# Patient Record
Sex: MALE | Race: WHITE | Employment: FULL TIME | RURAL
[De-identification: names, ages, dates, MRNs, and addresses within clinical notes are randomized per-mention and may not be internally consistent; named-entity substitution may affect disease eponyms.]

---

## 2022-01-24 ENCOUNTER — TELEPHONE (OUTPATIENT)
Dept: FAMILY MEDICINE CLINIC | Age: 52
End: 2022-01-24

## 2022-01-24 NOTE — TELEPHONE ENCOUNTER
Patient has not yet established care. He needs an appointment [facility] orders.

## 2022-01-24 NOTE — TELEPHONE ENCOUNTER
Emma from [facility] is calling to see if Dr. Francy Vasquez would do a verbal for Pt to have Speech Therapy from having a stroke. Pt had a stroke from [address] [facility]. He was at [facility]. I reminded Home Health that Pt had not been seen yet. She understands.

## 2022-01-25 ENCOUNTER — TELEPHONE (OUTPATIENT)
Dept: FAMILY MEDICINE CLINIC | Age: 52
End: 2022-01-25

## 2022-01-25 NOTE — TELEPHONE ENCOUNTER
Pt's Blood Pressure is 159 over 100. Wife states BP has been high since hospital stay.  Dr Keyla Kingston Pt, Please advise

## 2022-01-25 NOTE — TELEPHONE ENCOUNTER
Attempted to call. No answer. Message left. Please advise that Dr Benita Velazquez has not seen this patient.

## 2022-02-17 ENCOUNTER — TELEPHONE (OUTPATIENT)
Dept: FAMILY MEDICINE CLINIC | Age: 52
End: 2022-02-17

## 2022-02-17 NOTE — TELEPHONE ENCOUNTER
----- Message from [address] sent at 2/16/2022  9:37 AM EST -----  Subject: Message to Provider    QUESTIONS  Information for Provider? Patient has appointment on 2/23 is requesting 4   days worth of Lipitor to get him through until his appointment.   ---------------------------------------------------------------------------  --------------  [address]  What is the best way for the office to contact you? OK to leave message on   voicemail  Preferred Call Back Phone Number? [phone]  ---------------------------------------------------------------------------  --------------  SCRIPT ANSWERS  Relationship to Patient? Third Party  Representative Name?  wife-ana

## 2022-02-18 ENCOUNTER — TELEPHONE (OUTPATIENT)
Dept: FAMILY MEDICINE CLINIC | Age: 52
End: 2022-02-18

## 2022-02-18 NOTE — TELEPHONE ENCOUNTER
Pt is done with home health and is ready for out patient therapy. Pt would like [facility] Referral sent, please advise. NP appt is set for 2-23 with Dr Soha Starr.

## 2022-02-18 NOTE — TELEPHONE ENCOUNTER
Returned call to Kassidy Vinson. She was advised referral will be put in at visit. Verbalized understanding.

## 2022-02-23 ENCOUNTER — TELEPHONE (OUTPATIENT)
Dept: FAMILY MEDICINE CLINIC | Age: 52
End: 2022-02-23

## 2022-02-23 ENCOUNTER — OFFICE VISIT (OUTPATIENT)
Dept: FAMILY MEDICINE CLINIC | Age: 52
End: 2022-02-23
Payer: COMMERCIAL

## 2022-02-23 VITALS
BODY MASS INDEX: 34.96 KG/M2 | HEIGHT: 70 IN | RESPIRATION RATE: 18 BRPM | TEMPERATURE: 98.3 F | OXYGEN SATURATION: 96 % | WEIGHT: 244.2 LBS | HEART RATE: 90 BPM | DIASTOLIC BLOOD PRESSURE: 91 MMHG | SYSTOLIC BLOOD PRESSURE: 140 MMHG

## 2022-02-23 DIAGNOSIS — Z86.73 HISTORY OF CVA (CEREBROVASCULAR ACCIDENT): ICD-10-CM

## 2022-02-23 DIAGNOSIS — E66.09 CLASS 2 OBESITY DUE TO EXCESS CALORIES WITHOUT SERIOUS COMORBIDITY WITH BODY MASS INDEX (BMI) OF 35.0 TO 35.9 IN ADULT: ICD-10-CM

## 2022-02-23 DIAGNOSIS — E78.00 PURE HYPERCHOLESTEROLEMIA: ICD-10-CM

## 2022-02-23 DIAGNOSIS — I63.81 LACUNAR INFARCTION (HCC): Primary | ICD-10-CM

## 2022-02-23 DIAGNOSIS — R53.1 RIGHT SIDED WEAKNESS: ICD-10-CM

## 2022-02-23 DIAGNOSIS — I10 PRIMARY HYPERTENSION: ICD-10-CM

## 2022-02-23 PROCEDURE — 99205 OFFICE O/P NEW HI 60 MIN: CPT | Performed by: FAMILY MEDICINE

## 2022-02-23 RX ORDER — METOPROLOL TARTRATE 25 MG/1
12.5 TABLET, FILM COATED ORAL 2 TIMES DAILY
Qty: 30 TABLET | Refills: 1 | Status: SHIPPED | OUTPATIENT
Start: 2022-02-23 | End: 2022-04-18 | Stop reason: SDUPTHER

## 2022-02-23 RX ORDER — ATORVASTATIN CALCIUM 40 MG/1
40 TABLET, FILM COATED ORAL DAILY
COMMUNITY
End: 2022-02-23 | Stop reason: SDUPTHER

## 2022-02-23 RX ORDER — CLOPIDOGREL BISULFATE 75 MG/1
75 TABLET ORAL DAILY
Qty: 30 TABLET | Refills: 1 | Status: SHIPPED | OUTPATIENT
Start: 2022-02-23 | End: 2022-04-18 | Stop reason: SDUPTHER

## 2022-02-23 RX ORDER — CLOPIDOGREL BISULFATE 75 MG/1
75 TABLET ORAL DAILY
COMMUNITY
Start: 2022-02-18 | End: 2022-02-23 | Stop reason: SDUPTHER

## 2022-02-23 RX ORDER — ASPIRIN 81 MG/1
81 TABLET ORAL DAILY
COMMUNITY
End: 2022-02-23

## 2022-02-23 RX ORDER — LOSARTAN POTASSIUM 25 MG/1
25 TABLET ORAL DAILY
Qty: 30 TABLET | Refills: 1 | Status: SHIPPED | OUTPATIENT
Start: 2022-02-23 | End: 2022-04-18 | Stop reason: SDUPTHER

## 2022-02-23 RX ORDER — ATORVASTATIN CALCIUM 40 MG/1
40 TABLET, FILM COATED ORAL DAILY
Qty: 30 TABLET | Refills: 1 | Status: SHIPPED | OUTPATIENT
Start: 2022-02-23 | End: 2022-04-18 | Stop reason: SDUPTHER

## 2022-02-23 NOTE — LETTER
2/23/2022 12:18 PM    Mr. Sonya Martines  [address] [address]      To Whom It May Concern:    Sonya Martines is currently under the care of Na Bird. He has established care in this office and we are working on a return to work plan. If there are questions or concerns please have the patient contact our office.         Sincerely,      Allegra Vyas MD

## 2022-02-23 NOTE — PROGRESS NOTES
1. Have you been to the ER, urgent care clinic since your last visit? Hospitalized since your last visit? Yes When: [facility] 1/17/-1/21/22 stroke, covid     2. Have you seen or consulted any other health care providers outside of the [address] [place] [address] since your last visit? Include any pap smears or colon screening. Yes When: [facility] dr. edmondson     Reviewed record in preparation for visit and have necessary documentation  Pt did not bring medication to office visit for review  Patient is accompanied by self I have received verbal consent from Jamaal Reyez to discuss any/all medical information while they are present in the room.     Goals that were addressed and/or need to be completed during or after this appointment include     Health Maintenance Due   Topic Date Due    Hepatitis C Screening  Never done    Depression Screen  Never done    COVID-19 Vaccine (1) Never done    DTaP/Tdap/Td series (1 - Tdap) Never done    Lipid Screen  Never done    Colorectal Cancer Screening Combo  Never done    Shingrix Vaccine Age 50> (1 of 2) Never done    Flu Vaccine (1) Never done

## 2022-02-24 NOTE — PROGRESS NOTES
Progress Note    Patient: Courtney aRvi MRN: [id number]  SSN: [id number]    YOB: 1970  Age: 46 y.o. Sex: male        Chief Complaint   Patient presents with   [address]     he is a 46y.o. year old male who presents to [facility]. Patient with recent hospitalization for stroke like symptoms and hypertensive urgency. Admitted to [facility] on 1/17/22 with discharge on 1/21/22. MRI identified acute lacunar infarct with old lacunar infarct identified as well. Patient with residual right sided weakness. He has been getting HH. He would like to start outpatient PT. He has been out of work since hospitalization. Patient discharged on metoprolol, atorvastatin and clopidogrel. He needs medication refills. Patient's wife has BP log showing HTN not well controlled. Encounter Diagnoses   Name Primary?     Lacunar infarction ([facility] Yes    History of CVA (cerebrovascular accident)     Right sided weakness     Primary hypertension     Pure hypercholesterolemia     Class 2 obesity due to excess calories without serious comorbidity with body mass index (BMI) of 35.0 to 35.9 in adult        Patient Active Problem List   Diagnosis Code    Migraine without aura, without mention of intractable migraine without mention of status migrainosus G43.009    Lacunar infarction ([facility] I63.81    Primary hypertension I10    History of CVA (cerebrovascular accident) Z86.73    Right sided weakness R53.1    Pure hypercholesterolemia E78.00    Class 2 obesity due to excess calories without serious comorbidity with body mass index (BMI) of 35.0 to 35.9 in adult E66.09, Z68.35     Past Surgical History:   Procedure Laterality Date    HX CHOLECYSTECTOMY       Social History     Socioeconomic History    Marital status:      Spouse name: Not on file    Number of children: Not on file    Years of education: Not on file    Highest education level: Not on file   Occupational History    Not on file   Tobacco Use  Smoking status: Never Smoker    Smokeless tobacco: Not on file   Substance and Sexual Activity    Alcohol use: No    Drug use: Not on file    Sexual activity: Not on file   Other Topics Concern    Not on file   Social History Narrative    Not on file     Social Determinants of Health     Financial Resource Strain:     Difficulty of Paying Living Expenses: Not on file   Food Insecurity:     Worried About Running Out of Food in the Last Year: Not on file    Eddie of Food in the Last Year: Not on file   Transportation Needs:     Lack of Transportation (Medical): Not on file    Lack of Transportation (Non-Medical): Not on file   Physical Activity:     Days of Exercise per Week: Not on file    Minutes of Exercise per Session: Not on file   Stress:     Feeling of Stress : Not on file   Social Connections:     Frequency of Communication with Friends and Family: Not on file    Frequency of Social Gatherings with Friends and Family: Not on file    Attends Scientology Services: Not on file    Active Member of [address] [place] [address] or Organizations: Not on file    Attends Club or Organization Meetings: Not on file    Marital Status: Not on file   Intimate Partner Violence:     Fear of Current or Ex-Partner: Not on file    Emotionally Abused: Not on file    Physically Abused: Not on file    Sexually Abused: Not on file   Housing Stability:     Unable to Pay for Housing in the Last Year: Not on file    Number of [place] in the Last Year: Not on file    Unstable Housing in the Last Year: Not on file     No family history on file. Current Outpatient Medications   Medication Sig    atorvastatin (LIPITOR) 40 mg tablet Take 1 Tablet by mouth daily.  metoprolol tartrate (LOPRESSOR) 25 mg tablet Take 0.5 Tablets by mouth two (2) times a day.  clopidogreL (PLAVIX) 75 mg tab Take 1 Tablet by mouth daily.  losartan (COZAAR) 25 mg tablet Take 1 Tablet by mouth daily.      No current facility-administered medications for this visit. Allergies   Allergen Reactions    Hydrocodone Other (comments)     headache       Review of Systems:  Constitutional: Negative for fatigue, malaise  Resp: Negative for cough, wheezing or SOB  CV: Negative for chest pain, dizziness or palpitations  GI: Negative for nausea or abdominal pain  MS: see HPI  Neuro: see HPI  Psych: Negative for depression or anxiety     Vitals:    02/23/22 1125 02/23/22 1140   BP: (!) 143/92 (!) 140/91   Pulse: 90    Resp: 18    Temp: 98.3 °F (36.8 °C)    TempSrc: Oral    SpO2: 96%    Weight: 244 lb 3.2 oz (110.8 kg)    Height: 5' 10\" (1.778 m)        Physical Examination:  General: Well developed, well nourished, in no acute distress  Head: Normocephalic, atraumatic  Eyes: Sclera clear, EOMI  Neck: Normal range of motion  Respiratory: Symmetrical, unlabored effort  Cardiovascular: Regular rate and rhythm  Extremities: Full range of motion, normal gait  Neurologic: right sided weakness  Psych: Active, alert and oriented. Affect appropriate       ICD-10-CM ICD-9-CM    1. Lacunar infarction ([facility]  I63.81 434.91    2. History of CVA (cerebrovascular accident)  Z86.73 V12.54 clopidogreL (PLAVIX) 75 mg tab   3. Right sided weakness  R53.1 728.87    4. Primary hypertension  I10 401.9 atorvastatin (LIPITOR) 40 mg tablet      metoprolol tartrate (LOPRESSOR) 25 mg tablet   5. Pure hypercholesterolemia  E78.00 272.0 losartan (COZAAR) 25 mg tablet   6. Class 2 obesity due to excess calories without serious comorbidity with body mass index (BMI) of 35.0 to 35.9 in adult  E66.09 278.00     Z68.35 V85.35        Plan of care:  Diagnoses were discussed in detail with patient. Medication risks/benefits/side effects discussed with patient. Importance of compliance with all prescribed medications discussed. All of the patient's questions were addressed and answered to apparent satisfaction. The patient understands and agrees with our plan of care.   The patient knows to call back if they have questions about the plan of care or if symptoms change. The patient received an After-Visit Summary which contains VS, diagnoses, orders, allergy and medication lists. Future Appointments   Date Time Provider Janice Kessler   3/9/2022  8:40 AM Dedra Andrew MD [facility] BS AMB           Follow-up and Dispositions    · Return in about 2 weeks (around 3/9/2022).

## 2022-03-07 ENCOUNTER — TELEPHONE (OUTPATIENT)
Dept: FAMILY MEDICINE CLINIC | Age: 52
End: 2022-03-07

## 2022-03-07 ENCOUNTER — NURSE TRIAGE (OUTPATIENT)
Dept: OTHER | Facility: CLINIC | Age: 52
End: 2022-03-07

## 2022-03-07 RX ORDER — METHOCARBAMOL 500 MG/1
500 TABLET, FILM COATED ORAL 3 TIMES DAILY
Qty: 20 TABLET | Refills: 0 | Status: SHIPPED | OUTPATIENT
Start: 2022-03-07 | End: 2022-04-11

## 2022-03-07 NOTE — TELEPHONE ENCOUNTER
Received call from Loli Dietrich at [facility] with [employer] Complaint. Subjective: Caller states \"went to physical therapy, pain in left neck radiates to left arm, hurts so bad it is causing nausea and headaches. Stiff yesterday, and feels like a spike in there hurts if I move. \"     Current Symptoms: left neck pain, radiates down left arm. Headache, nausea. Denies chest pain. Denies difficulty breathing. Denies weakness/numbness. Onset: 3 days ago; sudden, worsening    Associated Symptoms: reduced activity    Pain Severity: 8/10; sharp and stabbing; Intermittent    Temperature: Denies      What has been tried: heating pad, didn't help. LMP: NA Pregnant: NA    Recommended disposition: See in Office Today. Advised to go to nearest C/ED if unable to get appointment. Care advice provided, patient verbalizes understanding; denies any other questions or concerns; instructed to call back for any new or worsening symptoms. Patient/Caller agrees with recommended disposition; writer provided warm transfer to WILL at [facility] for appointment scheduling    Attention Provider: Thank you for allowing me to participate in the care of your patient. The patient was connected to triage in response to information provided to the [facility]. Please do not respond through this encounter as the response is not directed to a shared pool.     Reason for Disposition   Patient wants to be seen    Protocols used: NECK PAIN OR STIFFNESS-ADULT-OH

## 2022-03-07 NOTE — TELEPHONE ENCOUNTER
----- Message from Francoise Francie sent at 3/7/2022  4:34 PM EST -----  Subject: Message to Provider    QUESTIONS  Information for Provider? PT would like something for pain, such as a   muscle relaxer. Please call PT ASAP. PT has appt Wed, but would like   relief now.  ---------------------------------------------------------------------------  --------------  CALL BACK INFO  What is the best way for the office to contact you? OK to leave message on   voicemail  Preferred Call Back Phone Number? [phone]  ---------------------------------------------------------------------------  --------------  SCRIPT ANSWERS  Relationship to Patient?  Self

## 2022-03-07 NOTE — TELEPHONE ENCOUNTER
Called patient's wife. She was advised Rx sent to pharmacy. Verbalized understanding.
Prescription e-scribed to pharmacy.
Pt wife states would to know if a muscle relaxer could be called into [facility] in [place]. Irma Kingston to Physical Therapy on Friday 3/4/22 and over that been in pain, today can not move without shooting pain. . pt has appt on Wednesday but dont think he can make it until then. Kiah Garcia
17-Mar-2020 12:55

## 2022-03-09 ENCOUNTER — OFFICE VISIT (OUTPATIENT)
Dept: FAMILY MEDICINE CLINIC | Age: 52
End: 2022-03-09
Payer: COMMERCIAL

## 2022-03-09 VITALS
WEIGHT: 238.8 LBS | DIASTOLIC BLOOD PRESSURE: 92 MMHG | HEART RATE: 79 BPM | RESPIRATION RATE: 18 BRPM | OXYGEN SATURATION: 93 % | SYSTOLIC BLOOD PRESSURE: 133 MMHG | HEIGHT: 70 IN | BODY MASS INDEX: 34.19 KG/M2 | TEMPERATURE: 98 F

## 2022-03-09 DIAGNOSIS — I10 PRIMARY HYPERTENSION: Primary | ICD-10-CM

## 2022-03-09 DIAGNOSIS — R73.9 BLOOD GLUCOSE ELEVATED: ICD-10-CM

## 2022-03-09 DIAGNOSIS — Z86.73 HISTORY OF CVA (CEREBROVASCULAR ACCIDENT): ICD-10-CM

## 2022-03-09 DIAGNOSIS — E78.00 PURE HYPERCHOLESTEROLEMIA: ICD-10-CM

## 2022-03-09 DIAGNOSIS — I63.81 LACUNAR INFARCTION (HCC): ICD-10-CM

## 2022-03-09 PROCEDURE — 99214 OFFICE O/P EST MOD 30 MIN: CPT | Performed by: FAMILY MEDICINE

## 2022-03-10 LAB
ALBUMIN SERPL-MCNC: 4.6 G/DL (ref 3.8–4.9)
ALBUMIN/GLOB SERPL: 1.7 {RATIO} (ref 1.2–2.2)
ALP SERPL-CCNC: 88 IU/L (ref 44–121)
ALT SERPL-CCNC: 50 IU/L (ref 0–44)
AST SERPL-CCNC: 31 IU/L (ref 0–40)
BILIRUB SERPL-MCNC: 0.7 MG/DL (ref 0–1.2)
BUN SERPL-MCNC: 10 MG/DL (ref 6–24)
BUN/CREAT SERPL: 10 (ref 9–20)
CALCIUM SERPL-MCNC: 10.1 MG/DL (ref 8.7–10.2)
CHLORIDE SERPL-SCNC: 101 MMOL/L (ref 96–106)
CHOLEST SERPL-MCNC: 112 MG/DL (ref 100–199)
CO2 SERPL-SCNC: 24 MMOL/L (ref 20–29)
CREAT SERPL-MCNC: 0.99 MG/DL (ref 0.76–1.27)
EGFR: 91 ML/MIN/1.73
ERYTHROCYTE [DISTWIDTH] IN BLOOD BY AUTOMATED COUNT: 12.8 % (ref 11.6–15.4)
EST. AVERAGE GLUCOSE BLD GHB EST-MCNC: 111 MG/DL
GLOBULIN SER CALC-MCNC: 2.7 G/DL (ref 1.5–4.5)
GLUCOSE SERPL-MCNC: 85 MG/DL (ref 65–99)
HBA1C MFR BLD: 5.5 % (ref 4.8–5.6)
HCT VFR BLD AUTO: 49.4 % (ref 37.5–51)
HDLC SERPL-MCNC: 33 MG/DL
HGB BLD-MCNC: 16.5 G/DL (ref 13–17.7)
LDLC SERPL CALC-MCNC: 52 MG/DL (ref 0–99)
MAGNESIUM SERPL-MCNC: 2.1 MG/DL (ref 1.6–2.3)
MCH RBC QN AUTO: 28.5 PG (ref 26.6–33)
MCHC RBC AUTO-ENTMCNC: 33.4 G/DL (ref 31.5–35.7)
MCV RBC AUTO: 86 FL (ref 79–97)
PLATELET # BLD AUTO: 268 X10E3/UL (ref 150–450)
POTASSIUM SERPL-SCNC: 4.5 MMOL/L (ref 3.5–5.2)
PROT SERPL-MCNC: 7.3 G/DL (ref 6–8.5)
RBC # BLD AUTO: 5.78 X10E6/UL (ref 4.14–5.8)
SODIUM SERPL-SCNC: 140 MMOL/L (ref 134–144)
TRIGL SERPL-MCNC: 155 MG/DL (ref 0–149)
TSH SERPL DL<=0.005 MIU/L-ACNC: 3.34 UIU/ML (ref 0.45–4.5)
VLDLC SERPL CALC-MCNC: 27 MG/DL (ref 5–40)
WBC # BLD AUTO: 10.5 X10E3/UL (ref 3.4–10.8)

## 2022-03-13 NOTE — PROGRESS NOTES
Progress Note    Patient: Sourav Christensen MRN: [id number]  SSN: [id number]    YOB: 1970  Age: 46 y.o. Sex: male        Chief Complaint   Patient presents with    Follow-up     2 week      he is a 46y.o. year old male who presents for follow up. Patient with recent hospitalization for stroke like symptoms and hypertensive urgency. He was admitted to [facility] on 1/17/22 with discharge on 1/21/22. MRI identified acute lacunar infarct with old lacunar infarct identified as well. Patient with residual right sided weakness. He has been getting outpatient PT. He has been out of work since hospitalization. He wants to return to work, however has not made sufficient progress with PT to do so. Patient has [place] paperwork and want to be able to return to work . Patient's wife has BP log. Encounter Diagnoses   Name Primary?  Primary hypertension Yes    Pure hypercholesterolemia     Lacunar infarction ([facility] [address]     History of CVA (cerebrovascular accident)     Blood glucose elevated      BP Readings from Last 3 Encounters:   03/09/22 (!) 133/92   02/23/22 (!) 140/91   05/31/13 124/70     Wt Readings from Last 3 Encounters:   03/09/22 238 lb 12.8 oz (108.3 kg)   02/23/22 244 lb 3.2 oz (110.8 kg)   05/31/13 246 lb (111.6 kg)     Body mass index is 34.26 kg/m².     Lab Results   Component Value Date/Time    WBC 10.5 03/09/2022 12:00 AM    HGB 16.5 03/09/2022 12:00 AM    HCT 49.4 03/09/2022 12:00 AM    PLATELET 714 25/20/8928 12:00 AM    MCV 86 03/09/2022 12:00 AM     Lab Results   Component Value Date/Time    Cholesterol, total 112 03/09/2022 12:00 AM    HDL Cholesterol 33 (L) 03/09/2022 12:00 AM    LDL, calculated 52 03/09/2022 12:00 AM    Triglyceride 155 (H) 03/09/2022 12:00 AM     Lab Results   Component Value Date/Time    TSH 3.340 03/09/2022 12:00 AM      Lab Results   Component Value Date/Time    Sodium 140 03/09/2022 12:00 AM    Potassium 4.5 03/09/2022 12:00 AM    Chloride 101 03/09/2022 12:00 AM    CO2 24 03/09/2022 12:00 AM    Anion gap 4 (L) 07/15/2009 11:20 PM    Glucose 85 03/09/2022 12:00 AM    BUN 10 03/09/2022 12:00 AM    Creatinine 0.99 03/09/2022 12:00 AM    BUN/Creatinine ratio 10 03/09/2022 12:00 AM    GFR est AA >60 07/15/2009 11:20 PM    GFR est non-AA >60 07/15/2009 11:20 PM    Calcium 10.1 03/09/2022 12:00 AM    Bilirubin, total 0.7 03/09/2022 12:00 AM    ALT (SGPT) 50 (H) 03/09/2022 12:00 AM    Alk.  phosphatase 88 03/09/2022 12:00 AM    Protein, total 7.3 03/09/2022 12:00 AM    Albumin 4.6 03/09/2022 12:00 AM    Globulin 2.7 07/15/2009 11:20 PM    A-G Ratio 1.7 03/09/2022 12:00 AM      Lab Results   Component Value Date/Time    Hemoglobin A1c 5.5 03/09/2022 12:00 AM        Patient Active Problem List   Diagnosis Code    Migraine without aura, without mention of intractable migraine without mention of status migrainosus G43.009    Lacunar infarction (HCC) I63.81    Primary hypertension I10    History of CVA (cerebrovascular accident) Z86.73    Right sided weakness R53.1    Pure hypercholesterolemia E78.00    Class 2 obesity due to excess calories without serious comorbidity with body mass index (BMI) of 35.0 to 35.9 in adult E66.09, Z68.35     Past Surgical History:   Procedure Laterality Date    HX CHOLECYSTECTOMY       Social History     Socioeconomic History    Marital status:      Spouse name: Not on file    Number of children: Not on file    Years of education: Not on file    Highest education level: Not on file   Occupational History    Not on file   Tobacco Use    Smoking status: Never Smoker    Smokeless tobacco: Not on file   Substance and Sexual Activity    Alcohol use: No    Drug use: Not on file    Sexual activity: Not on file   Other Topics Concern    Not on file   Social History Narrative    Not on file     Social Determinants of Health     Financial Resource Strain:     Difficulty of Paying Living Expenses: Not on file   Food Insecurity:     Worried About Running Out of Food in the Last Year: Not on file    Ran Out of Food in the Last Year: Not on file   Transportation Needs:     Lack of Transportation (Medical): Not on file    Lack of Transportation (Non-Medical): Not on file   Physical Activity:     Days of Exercise per Week: Not on file    Minutes of Exercise per Session: Not on file   Stress:     Feeling of Stress : Not on file   Social Connections:     Frequency of Communication with Friends and Family: Not on file    Frequency of Social Gatherings with Friends and Family: Not on file    Attends Sikh Services: Not on file    Active Member of [address] [place] [address] [employer] or Organizations: Not on file    Attends Club or Organization Meetings: Not on file    Marital Status: Not on file   Intimate Partner Violence:     Fear of Current or Ex-Partner: Not on file    Emotionally Abused: Not on file    Physically Abused: Not on file    Sexually Abused: Not on file   Housing Stability:     Unable to Pay for Housing in the Last Year: Not on file    Number of [place] in the Last Year: Not on file    Unstable Housing in the Last Year: Not on file     No family history on file. Current Outpatient Medications   Medication Sig    methocarbamoL (ROBAXIN) 500 mg tablet Take 1 Tablet by mouth three (3) times daily.  atorvastatin (LIPITOR) 40 mg tablet Take 1 Tablet by mouth daily.  metoprolol tartrate (LOPRESSOR) 25 mg tablet Take 0.5 Tablets by mouth two (2) times a day.  clopidogreL (PLAVIX) 75 mg tab Take 1 Tablet by mouth daily.  losartan (COZAAR) 25 mg tablet Take 1 Tablet by mouth daily. No current facility-administered medications for this visit.      Allergies   Allergen Reactions    Hydrocodone Other (comments)     headache       Review of Systems:  Constitutional: Negative for fatigue, malaise  Resp: Negative for cough, wheezing or SOB  CV: Negative for chest pain, dizziness or palpitations  GI: Negative for nausea or abdominal pain  MS: see HPI  Neuro: see HPI  Psych: Negative for depression or anxiety     Vitals:    03/09/22 0846 03/09/22 0906   BP: (!) 130/92 (!) 133/92   Pulse: 79    Resp: 18    Temp: 98 °F (36.7 °C)    TempSrc: Oral    SpO2: 93%    Weight: 238 lb 12.8 oz (108.3 kg)    Height: 5' 10\" (1.778 m)        Physical Examination:  General: Well developed, well nourished, in no acute distress  Head: Normocephalic, atraumatic  Eyes: Sclera clear, EOMI  Neck: Normal range of motion  Respiratory: Symmetrical, unlabored effort  Cardiovascular: Regular rate and rhythm  Extremities: Full range of motion, normal gait  Neurologic: right sided weakness  Psych: Active, alert and oriented. Affect appropriate       ICD-10-CM ICD-9-CM    1. Primary hypertension  I10 401.9 LIPID PANEL      MAGNESIUM      METABOLIC PANEL, COMPREHENSIVE      CBC W/O DIFF      TSH 3RD GENERATION      TSH 3RD GENERATION      CBC W/O DIFF      METABOLIC PANEL, COMPREHENSIVE      MAGNESIUM      LIPID PANEL   2. Pure hypercholesterolemia  E78.00 272.0 LIPID PANEL      LIPID PANEL   3. [facility]  I63.81 434.91 HEMOGLOBIN A1C WITH EAG      HEMOGLOBIN A1C WITH EAG   4. History of CVA (cerebrovascular accident)  Z86.73 V12.54 HEMOGLOBIN A1C WITH EAG      HEMOGLOBIN A1C WITH EAG   5. Blood glucose elevated  R73.9 790.29 HEMOGLOBIN A1C WITH EAG      HEMOGLOBIN A1C WITH EAG       Plan of care:  Diagnoses were discussed in detail with patient. Medications reviewed and appropriate. Patient to continue current prescribed medications as written. Medication risks/benefits/side effects discussed with patient. Importance of compliance with all prescribed medications discussed. All of the patient's questions were addressed and answered to apparent satisfaction. The patient understands and agrees with our plan of care. The patient knows to call back if they have questions about the plan of care or if symptoms change.   The patient received an After-Visit Summary which contains VS, diagnoses, orders, allergy and medication lists. Future Appointments   Date Time Provider Janice Victoria   3/18/2022  3:00 PM Melvin Martinez  [facility] BS AMB   4/11/2022  3:40 PM [facility]           Follow-up and Dispositions    · Return in about 1 month (around 4/11/2022).

## 2022-03-18 ENCOUNTER — OFFICE VISIT (OUTPATIENT)
Dept: NEUROLOGY | Age: 52
End: 2022-03-18
Payer: COMMERCIAL

## 2022-03-18 VITALS
HEART RATE: 109 BPM | DIASTOLIC BLOOD PRESSURE: 80 MMHG | WEIGHT: 239.7 LBS | HEIGHT: 70 IN | SYSTOLIC BLOOD PRESSURE: 120 MMHG | BODY MASS INDEX: 34.32 KG/M2 | OXYGEN SATURATION: 97 %

## 2022-03-18 DIAGNOSIS — Z86.73 HISTORY OF RECENT STROKE: Primary | ICD-10-CM

## 2022-03-18 PROCEDURE — 99204 OFFICE O/P NEW MOD 45 MIN: CPT | Performed by: SPECIALIST

## 2022-03-18 NOTE — PROGRESS NOTES
Neurology Consult      Subjective: Ernesto Luna is a 46 y.o. male who comes in today with his spouse. Had a stroke 1-17-22 experienced and worked up at [facility] with speech changes right hemiparesis arm greater than leg. Had some facial weakness as well. I understand head CT was unrevealing MRI positive for acute pontine stroke and an old left caudate lacunar infarct. CT perfusion scan normal CTA of the head and neck normal and it was the MRI that disclosed the ultimate pathology. Head and neck MRA was normal as well. And ended up with a positive Covid encounter. Occupational therapy has signed off and still in PT which will run for a total of 2 visits a week for 15 weeks. Says he is very motivated to help himself and does walking and other resistance exercises at home. Feels subjectively he is 50% better with his walking and 45% improvement in his right arm. Reminds me of his background hypertension hypercholesterolemia but no diabetes organic heart disease non-smoker nonalcohol consumer. Says he will do whatever it takes to get back to his baseline and I certainly believe it and respect that. We will suggest a revisit in 3 months. Current Outpatient Medications   Medication Sig Dispense Refill    atorvastatin (LIPITOR) 40 mg tablet Take 1 Tablet by mouth daily. 30 Tablet 1    metoprolol tartrate (LOPRESSOR) 25 mg tablet Take 0.5 Tablets by mouth two (2) times a day. 30 Tablet 1    clopidogreL (PLAVIX) 75 mg tab Take 1 Tablet by mouth daily. 30 Tablet 1    losartan (COZAAR) 25 mg tablet Take 1 Tablet by mouth daily. 30 Tablet 1    methocarbamoL (ROBAXIN) 500 mg tablet Take 1 Tablet by mouth three (3) times daily.  (Patient not taking: Reported on 3/18/2022) 20 Tablet 0      Allergies   Allergen Reactions    Hydrocodone Other (comments)     headache     Past Medical History:   Diagnosis Date    Hypertension     Migraines     Stroke [facility])       Past Surgical History: Procedure Laterality Date    HX CHOLECYSTECTOMY        Social History     Socioeconomic History    Marital status:      Spouse name: Not on file    Number of children: Not on file    Years of education: Not on file    Highest education level: Not on file   Occupational History    Not on file   Tobacco Use    Smoking status: Never Smoker    Smokeless tobacco: Not on file   Substance and Sexual Activity    Alcohol use: No    Drug use: Not on file    Sexual activity: Not on file   Other Topics Concern    Not on file   Social History Narrative    Not on file     Social Determinants of Health     Financial Resource Strain:     Difficulty of Paying Living Expenses: Not on file   Food Insecurity:     Worried About Running Out of Food in the Last Year: Not on file    Eddie of Food in the Last Year: Not on file   Transportation Needs:     Lack of Transportation (Medical): Not on file    Lack of Transportation (Non-Medical): Not on file   Physical Activity:     Days of Exercise per Week: Not on file    Minutes of Exercise per Session: Not on file   Stress:     Feeling of Stress : Not on file   Social Connections:     Frequency of Communication with Friends and Family: Not on file    Frequency of Social Gatherings with Friends and Family: Not on file    Attends [employer] Services: Not on file    Active Member of [address] [place] [address] [employer] or Organizations: Not on file    Attends Club or Organization Meetings: Not on file    Marital Status: Not on file   Intimate Partner Violence:     Fear of Current or Ex-Partner: Not on file    Emotionally Abused: Not on file    Physically Abused: Not on file    Sexually Abused: Not on file   Housing Stability:     Unable to Pay for Housing in the Last Year: Not on file    Number of [place] in the Last Year: Not on file    Unstable Housing in the Last Year: Not on file      No family history on file.    Visit Vitals  /80   Pulse (!) 109   Ht 5' 10\" (1.778 m) Wt 108.7 kg (239 lb 11.2 oz)   SpO2 97%   BMI 34.39 kg/m²        Review of Systems:   A comprehensive review of systems was negative except for that written in the HPI. Neuro Exam:     Appearance: The patient is well developed, well nourished, provides a coherent history and is in no acute distress. Mental Status: Oriented to time, place and person. Mood and affect appropriate. Cranial Nerves:   Intact visual fields. Fundi are benign. YING, EOM's full, no nystagmus, no ptosis. Facial sensation is normal. Corneal reflexes are intact. Facial movement is symmetric. Hearing is normal bilaterally. Palate is midline with normal sternocleidomastoid and trapezius muscles are normal. Tongue is midline. Motor:  5-/5 strength in right upper and lower proximal and distal muscles and 5/5 on the left. Normal bulk and tone. No fasciculations. Reflexes:   Deep tendon reflexes 1-2+/4 and symmetrical.   Sensory:   Normal to touch, pinprick and vibration. Gait:   Slight hesitancy with right foot placement step to step. Tremor:   No tremor noted. Cerebellar:  No cerebellar signs present. Neurovascular:  Normal heart sounds and regular rhythm, peripheral pulses intact, and no carotid bruits. Assessment:   Acute pontine lacunar infarct. Hopefully will continue improved with time. He is very invested in his rehab and hopefully he can develop stamina in addition to increasing strength. Continue to monitor blood pressure cholesterol. Suggest revisit in about 3 months. Plan:   Revisit 3 months.   Signed by :  Portia Whittaker MD

## 2022-03-18 NOTE — PATIENT INSTRUCTIONS
Patient history viewed patient examined. Fortunately patient on the improving end of a serious stroke and hope that continues to be the case. As far as preventative medicine goes keep blood pressure is good and cholesterol in reasonable levels as well. Continue with the rehabilitation to enhance his right body control and stamina. Will suggest revisit in about 3 months. Continue on the Plavix metoprolol and losartan.

## 2022-03-19 PROBLEM — E66.09 CLASS 2 OBESITY DUE TO EXCESS CALORIES WITHOUT SERIOUS COMORBIDITY WITH BODY MASS INDEX (BMI) OF 35.0 TO 35.9 IN ADULT: Status: ACTIVE | Noted: 2022-02-23

## 2022-03-19 PROBLEM — Z86.73 HISTORY OF CVA (CEREBROVASCULAR ACCIDENT): Status: ACTIVE | Noted: 2022-02-23

## 2022-03-19 PROBLEM — E78.00 PURE HYPERCHOLESTEROLEMIA: Status: ACTIVE | Noted: 2022-02-23

## 2022-03-19 PROBLEM — R53.1 RIGHT SIDED WEAKNESS: Status: ACTIVE | Noted: 2022-02-23

## 2022-03-19 PROBLEM — E66.812 CLASS 2 OBESITY DUE TO EXCESS CALORIES WITHOUT SERIOUS COMORBIDITY WITH BODY MASS INDEX (BMI) OF 35.0 TO 35.9 IN ADULT: Status: ACTIVE | Noted: 2022-02-23

## 2022-03-19 PROBLEM — I63.81 LACUNAR INFARCTION (HCC): Status: ACTIVE | Noted: 2022-02-23

## 2022-03-20 PROBLEM — I10 PRIMARY HYPERTENSION: Status: ACTIVE | Noted: 2022-02-23

## 2022-04-05 ENCOUNTER — TELEPHONE (OUTPATIENT)
Dept: FAMILY MEDICINE CLINIC | Age: 52
End: 2022-04-05

## 2022-04-05 NOTE — TELEPHONE ENCOUNTER
Called and spoke with patient's wife. She was advised [facility] paperwork has been completed and faxed today. Verbalized understanding.

## 2022-04-11 ENCOUNTER — OFFICE VISIT (OUTPATIENT)
Dept: FAMILY MEDICINE CLINIC | Age: 52
End: 2022-04-11
Payer: COMMERCIAL

## 2022-04-11 VITALS
OXYGEN SATURATION: 97 % | RESPIRATION RATE: 16 BRPM | WEIGHT: 237.4 LBS | HEART RATE: 100 BPM | BODY MASS INDEX: 33.99 KG/M2 | DIASTOLIC BLOOD PRESSURE: 81 MMHG | TEMPERATURE: 98.5 F | SYSTOLIC BLOOD PRESSURE: 130 MMHG | HEIGHT: 70 IN

## 2022-04-11 DIAGNOSIS — R53.1 RIGHT SIDED WEAKNESS: ICD-10-CM

## 2022-04-11 DIAGNOSIS — I10 PRIMARY HYPERTENSION: Primary | ICD-10-CM

## 2022-04-11 DIAGNOSIS — Z86.73 HISTORY OF CVA (CEREBROVASCULAR ACCIDENT): ICD-10-CM

## 2022-04-11 PROCEDURE — 99214 OFFICE O/P EST MOD 30 MIN: CPT | Performed by: FAMILY MEDICINE

## 2022-04-11 NOTE — PROGRESS NOTES
1. Have you been to the ER, urgent care clinic since your last visit? Hospitalized since your last visit? No    2. Have you seen or consulted any other health care providers outside of the [address] [place] [address] since your last visit? Include any pap smears or colon screening. No    3. For patients aged 39-70: Has the patient had a colonoscopy / FIT/ Cologuard? 10 years per patient     If the patient is female:    4. For patients aged 41-77: Has the patient had a mammogram within the past 2 years? NA - based on age or sex      11. For patients aged 21-65: Has the patient had a pap smear? NA - based on age or sex     Reviewed record in preparation for visit and have necessary documentation  Pt did not bring medication to office visit for review  Patient is accompanied by wife I have received verbal consent from Monika Mejia to discuss any/all medical information while they are present in the room.     Goals that were addressed and/or need to be completed during or after this appointment include     Health Maintenance Due   Topic Date Due    Hepatitis C Screening  Never done    COVID-19 Vaccine (1) Never done    DTaP/Tdap/Td series (1 - Tdap) Never done    Colorectal Cancer Screening Combo  Never done    Shingrix Vaccine Age 50> (1 of 2) Never done

## 2022-04-14 DIAGNOSIS — I63.81 LACUNAR INFARCTION (HCC): ICD-10-CM

## 2022-04-14 DIAGNOSIS — Z86.73 HISTORY OF CVA (CEREBROVASCULAR ACCIDENT): Primary | ICD-10-CM

## 2022-04-16 NOTE — PROGRESS NOTES
Progress Note    Patient: Jae Whiting MRN: [id number]  SSN: [id number]    YOB: 1970  Age: 46 y.o. Sex: male        Chief Complaint   Patient presents with    Follow-up     he is a 46y.o. year old male who presents for follow up of CVA. Patient with residual right sided weakness. He has been getting outpatient PT. He reports continued modest improvement. He has been out of work since hospitalization. He wants to return to work, however has not made sufficient progress with PT to do so. Encounter Diagnoses   Name Primary?  Primary hypertension Yes    Right sided weakness     History of CVA (cerebrovascular accident)      BP Readings from Last 3 Encounters:   04/11/22 130/81   03/18/22 120/80   03/09/22 (!) 133/92     Wt Readings from Last 3 Encounters:   04/11/22 237 lb 6.4 oz (107.7 kg)   03/18/22 239 lb 11.2 oz (108.7 kg)   03/09/22 238 lb 12.8 oz (108.3 kg)     Body mass index is 34.06 kg/m².     Lab Results   Component Value Date/Time    WBC 10.5 03/09/2022 12:00 AM    HGB 16.5 03/09/2022 12:00 AM    HCT 49.4 03/09/2022 12:00 AM    PLATELET 164 88/92/3620 12:00 AM    MCV 86 03/09/2022 12:00 AM     Lab Results   Component Value Date/Time    Cholesterol, total 112 03/09/2022 12:00 AM    HDL Cholesterol 33 (L) 03/09/2022 12:00 AM    LDL, calculated 52 03/09/2022 12:00 AM    Triglyceride 155 (H) 03/09/2022 12:00 AM     Lab Results   Component Value Date/Time    TSH 3.340 03/09/2022 12:00 AM      Lab Results   Component Value Date/Time    Sodium 140 03/09/2022 12:00 AM    Potassium 4.5 03/09/2022 12:00 AM    Chloride 101 03/09/2022 12:00 AM    CO2 24 03/09/2022 12:00 AM    Anion gap 4 (L) 07/15/2009 11:20 PM    Glucose 85 03/09/2022 12:00 AM    BUN 10 03/09/2022 12:00 AM    Creatinine 0.99 03/09/2022 12:00 AM    BUN/Creatinine ratio 10 03/09/2022 12:00 AM    GFR est AA >60 07/15/2009 11:20 PM    GFR est non-AA >60 07/15/2009 11:20 PM    Calcium 10.1 03/09/2022 12:00 AM    Bilirubin, total 0.7 03/09/2022 12:00 AM    ALT (SGPT) 50 (H) 03/09/2022 12:00 AM    Alk. phosphatase 88 03/09/2022 12:00 AM    Protein, total 7.3 03/09/2022 12:00 AM    Albumin 4.6 03/09/2022 12:00 AM    Globulin 2.7 07/15/2009 11:20 PM    A-G Ratio 1.7 03/09/2022 12:00 AM      Lab Results   Component Value Date/Time    Hemoglobin A1c 5.5 03/09/2022 12:00 AM        Patient Active Problem List   Diagnosis Code    Migraine without aura, without mention of intractable migraine without mention of status migrainosus G43.009    Lacunar infarction (HCC) I63.81    Primary hypertension I10    History of CVA (cerebrovascular accident) Z86.73    Right sided weakness R53.1    Pure hypercholesterolemia E78.00    Class 2 obesity due to excess calories without serious comorbidity with body mass index (BMI) of 35.0 to 35.9 in adult E66.09, Z68.35     Past Surgical History:   Procedure Laterality Date    HX CHOLECYSTECTOMY       Social History     Socioeconomic History    Marital status:      Spouse name: Not on file    Number of children: Not on file    Years of education: Not on file    Highest education level: Not on file   Occupational History    Not on file   Tobacco Use    Smoking status: Never Smoker    Smokeless tobacco: Not on file   Substance and Sexual Activity    Alcohol use: No    Drug use: Not on file    Sexual activity: Not on file   Other Topics Concern    Not on file   Social History Narrative    Not on file     Social Determinants of Health     Financial Resource Strain:     Difficulty of Paying Living Expenses: Not on file   Food Insecurity:     Worried About Running Out of Food in the Last Year: Not on file    Eddie of Food in the Last Year: Not on file   Transportation Needs:     Lack of Transportation (Medical): Not on file    Lack of Transportation (Non-Medical):  Not on file   Physical Activity:     Days of Exercise per Week: Not on file    Minutes of Exercise per Session: Not on file Stress:     Feeling of Stress : Not on file   Social Connections:     Frequency of Communication with Friends and Family: Not on file    Frequency of Social Gatherings with Friends and Family: Not on file    Attends Buddhism Services: Not on file    Active Member of Clubs or Organizations: Not on file    Attends Club or Organization Meetings: Not on file    Marital Status: Not on file   Intimate Partner Violence:     Fear of Current or Ex-Partner: Not on file    Emotionally Abused: Not on file    Physically Abused: Not on file    Sexually Abused: Not on file   Housing Stability:     Unable to Pay for Housing in the Last Year: Not on file    Number of [place] in the Last Year: Not on file    Unstable Housing in the Last Year: Not on file     No family history on file. Current Outpatient Medications   Medication Sig    atorvastatin (LIPITOR) 40 mg tablet Take 1 Tablet by mouth daily.  metoprolol tartrate (LOPRESSOR) 25 mg tablet Take 0.5 Tablets by mouth two (2) times a day.  clopidogreL (PLAVIX) 75 mg tab Take 1 Tablet by mouth daily.  losartan (COZAAR) 25 mg tablet Take 1 Tablet by mouth daily. No current facility-administered medications for this visit.      Allergies   Allergen Reactions    Hydrocodone Other (comments)     headache       Review of Systems:  Constitutional: Negative for fatigue, malaise  Resp: Negative for cough, wheezing or SOB  CV: Negative for chest pain, dizziness or palpitations  GI: Negative for nausea or abdominal pain  MS: see HPI  Neuro: see HPI  Psych: Negative for depression or anxiety     Vitals:    04/11/22 1541   BP: 130/81   Pulse: 100   Resp: 16   Temp: 98.5 °F (36.9 °C)   TempSrc: Oral   SpO2: 97%   Weight: 237 lb 6.4 oz (107.7 kg)   Height: 5' 10\" (1.778 m)       Physical Examination:  General: Well developed, well nourished, in no acute distress  Head: Normocephalic, atraumatic  Eyes: Sclera clear, EOMI  Neck: Normal range of motion  Respiratory: Symmetrical, unlabored effort  Cardiovascular: Regular rate and rhythm  Extremities: Full range of motion, normal gait  Neurologic: right sided UE weakness  Psych: Active, alert and oriented. Affect appropriate       ICD-10-CM ICD-9-CM    1. Primary hypertension  I10 401.9    2. Right sided weakness  R53.1 728.87    3. History of CVA (cerebrovascular accident)  Z86.73 V12.54        Plan of care:  Diagnoses were discussed in detail with patient. Medications reviewed and appropriate. Patient to continue current prescribed medications as written. Medication risks/benefits/side effects discussed with patient. Importance of compliance with all prescribed medications discussed. All of the patient's questions were addressed and answered to apparent satisfaction. The patient understands and agrees with our plan of care. The patient knows to call back if they have questions about the plan of care or if symptoms change. The patient received an After-Visit Summary which contains VS, diagnoses, orders, allergy and medication lists. Future Appointments   Date Time Provider Janice Kessler   6/20/2022  2:40 PM Kiar Shetty  [facility] AMB   7/11/2022  3:20 PM Juliet Almazan MD [facility] AMB           Follow-up and Dispositions    · Return in about 3 months (around 7/11/2022).

## 2022-04-18 DIAGNOSIS — I10 PRIMARY HYPERTENSION: ICD-10-CM

## 2022-04-18 DIAGNOSIS — Z86.73 HISTORY OF CVA (CEREBROVASCULAR ACCIDENT): ICD-10-CM

## 2022-04-18 DIAGNOSIS — E78.00 PURE HYPERCHOLESTEROLEMIA: ICD-10-CM

## 2022-04-18 RX ORDER — METOPROLOL TARTRATE 25 MG/1
12.5 TABLET, FILM COATED ORAL 2 TIMES DAILY
Qty: 45 TABLET | Refills: 1 | Status: SHIPPED | OUTPATIENT
Start: 2022-04-18 | End: 2022-07-16

## 2022-04-18 RX ORDER — ATORVASTATIN CALCIUM 40 MG/1
40 TABLET, FILM COATED ORAL DAILY
Qty: 90 TABLET | Refills: 1 | Status: SHIPPED | OUTPATIENT
Start: 2022-04-18 | End: 2022-08-17 | Stop reason: SDUPTHER

## 2022-04-18 RX ORDER — CLOPIDOGREL BISULFATE 75 MG/1
75 TABLET ORAL DAILY
Qty: 90 TABLET | Refills: 1 | Status: SHIPPED | OUTPATIENT
Start: 2022-04-18 | End: 2022-08-17 | Stop reason: SDUPTHER

## 2022-04-18 RX ORDER — LOSARTAN POTASSIUM 25 MG/1
25 TABLET ORAL DAILY
Qty: 90 TABLET | Refills: 1 | Status: SHIPPED | OUTPATIENT
Start: 2022-04-18 | End: 2022-08-17 | Stop reason: SDUPTHER

## 2022-06-20 ENCOUNTER — OFFICE VISIT (OUTPATIENT)
Dept: NEUROLOGY | Age: 52
End: 2022-06-20
Payer: COMMERCIAL

## 2022-06-20 VITALS
DIASTOLIC BLOOD PRESSURE: 84 MMHG | SYSTOLIC BLOOD PRESSURE: 118 MMHG | RESPIRATION RATE: 14 BRPM | WEIGHT: 244 LBS | HEART RATE: 105 BPM | OXYGEN SATURATION: 95 % | BODY MASS INDEX: 35.01 KG/M2

## 2022-06-20 DIAGNOSIS — Z86.73 HISTORY OF STROKE: Primary | ICD-10-CM

## 2022-06-20 PROCEDURE — 99214 OFFICE O/P EST MOD 30 MIN: CPT | Performed by: SPECIALIST

## 2022-06-20 NOTE — PROGRESS NOTES
Neurology Consult      Subjective: Emory Unger is a 46 y.o. male who comes in today with his spouse. History of pontine stroke evaluated at [facility] and subsequent rehab. As noted definitive improvement since last year and risk factors include hypertension and hypercholesterolemia. Has noticed an element of fatigue that is somewhat rate limiting especially in his low back in terms of performance. Is on Plavix Lipitor and Lopressor and Cozaar. As I understand his discourse today cognition is good and no issues with mood and behavior. On this particular encounter he has no rate limiting features as it goes to driving and the spouse did not reference any concerns from his time at home and her personal observation. So on this visit suggest he follow-up with primary care for control of vascular risks categories already referenced and good luck. Current Outpatient Medications   Medication Sig Dispense Refill    metoprolol tartrate (LOPRESSOR) 25 mg tablet Take 0.5 Tablets by mouth two (2) times a day. 45 Tablet 1    clopidogreL (PLAVIX) 75 mg tab Take 1 Tablet by mouth daily. 90 Tablet 1    losartan (COZAAR) 25 mg tablet Take 1 Tablet by mouth daily. 90 Tablet 1    atorvastatin (LIPITOR) 40 mg tablet Take 1 Tablet by mouth daily.  90 Tablet 1      Allergies   Allergen Reactions    Hydrocodone Other (comments)     headache     Past Medical History:   Diagnosis Date    Hypertension     Migraines     Stroke [facility])       Past Surgical History:   Procedure Laterality Date    HX CHOLECYSTECTOMY        Social History     Socioeconomic History    Marital status:      Spouse name: Not on file    Number of children: Not on file    Years of education: Not on file    Highest education level: Not on file   Occupational History    Not on file   Tobacco Use    Smoking status: Never Smoker    Smokeless tobacco: Not on file   Substance and Sexual Activity    Alcohol use: No    Drug use: Not on file    Sexual activity: Not on file   Other Topics Concern    Not on file   Social History Narrative    Not on file     Social Determinants of Health     Financial Resource Strain:     Difficulty of Paying Living Expenses: Not on file   Food Insecurity:     Worried About Running Out of Food in the Last Year: Not on file    Eddie of Food in the Last Year: Not on file   Transportation Needs:     Lack of Transportation (Medical): Not on file    Lack of Transportation (Non-Medical): Not on file   Physical Activity:     Days of Exercise per Week: Not on file    Minutes of Exercise per Session: Not on file   Stress:     Feeling of Stress : Not on file   Social Connections:     Frequency of Communication with Friends and Family: Not on file    Frequency of Social Gatherings with Friends and Family: Not on file    Attends Congregational Services: Not on file    Active Member of [address] [place] [address] or Organizations: Not on file    Attends Club or Organization Meetings: Not on file    Marital Status: Not on file   Intimate Partner Violence:     Fear of Current or Ex-Partner: Not on file    Emotionally Abused: Not on file    Physically Abused: Not on file    Sexually Abused: Not on file   Housing Stability:     Unable to Pay for Housing in the Last Year: Not on file    Number of [place] in the Last Year: Not on file    Unstable Housing in the Last Year: Not on file      No family history on file. Visit Vitals  /84 (BP 1 Location: Left arm, BP Patient Position: Sitting, BP Cuff Size: Adult)   Pulse (!) 105   Resp 14   Wt 110.7 kg (244 lb)   SpO2 95%   BMI 35.01 kg/m²        Review of Systems:   A comprehensive review of systems was negative except for that written in the HPI. Neuro Exam:     Appearance: The patient is well developed, well nourished, provides a coherent history and is in no acute distress. Mental Status: Oriented to time, place and person. Mood and affect appropriate. Cranial Nerves:   Intact visual fields. Fundi are benign. YING, EOM's full, no nystagmus, no ptosis. Facial sensation is normal. Corneal reflexes are intact. Facial movement is symmetric. Hearing is normal bilaterally. Palate is midline with normal sternocleidomastoid and trapezius muscles are normal. Tongue is midline. Motor:  5/5 strength in upper and lower proximal and distal muscles on the left and has a subtle right hand pronator drift and 5-/5 performance this afternoon. . Normal bulk and tone. No fasciculations. Reflexes:   Deep tendon reflexes 1-2+/4 and symmetrical.   Sensory:   Normal to touch, pinprick and vibration. Gait:  Normal gait. Tremor:   No tremor noted. Cerebellar:  No cerebellar signs present. Neurovascular:  Normal heart sounds and regular rhythm, peripheral pulses intact, and no carotid bruits. Assessment:   Pontine stroke. Has improved nicely since last seen months ago. Will return to primary care for risk factor supervision and modification. Has some residual fatigue but by degrees that will improve. Plan:   Revisit as needed.   Signed by :  Marcela Martinez MD

## 2022-06-20 NOTE — PATIENT INSTRUCTIONS
Patient history viewed patient examined. Has improved since last being seen several months ago. There is a fatigue factor and will be working through that by degrees. 1 year anniversary date will be an important marker. Will suggest that he continue follow-up with primary care as to vascular risk management etc.  Good luck.

## 2022-06-20 NOTE — LETTER
6/20/2022    Patient: Victoria Chauhan   YOB: 1970   Date of Visit: 6/20/2022     Morales Medellin MD  [address] [facility] [address]  Via In Basket    Dear Morales Medellin MD,      Thank you for referring Mr. Mckinley Mcdonald to [facility] for evaluation. My notes for this consultation are attached. If you have questions, please do not hesitate to call me. I look forward to following your patient along with you.       Sincerely,    Leoncio Fraire MD

## 2022-07-11 ENCOUNTER — OFFICE VISIT (OUTPATIENT)
Dept: FAMILY MEDICINE CLINIC | Age: 52
End: 2022-07-11
Payer: COMMERCIAL

## 2022-07-11 VITALS
OXYGEN SATURATION: 97 % | BODY MASS INDEX: 34.27 KG/M2 | SYSTOLIC BLOOD PRESSURE: 126 MMHG | TEMPERATURE: 98 F | HEART RATE: 107 BPM | WEIGHT: 239.4 LBS | HEIGHT: 70 IN | RESPIRATION RATE: 16 BRPM | DIASTOLIC BLOOD PRESSURE: 87 MMHG

## 2022-07-11 DIAGNOSIS — M54.50 CHRONIC BILATERAL LOW BACK PAIN WITHOUT SCIATICA: ICD-10-CM

## 2022-07-11 DIAGNOSIS — Z86.73 HISTORY OF CVA (CEREBROVASCULAR ACCIDENT): ICD-10-CM

## 2022-07-11 DIAGNOSIS — I10 PRIMARY HYPERTENSION: Primary | ICD-10-CM

## 2022-07-11 DIAGNOSIS — E78.00 PURE HYPERCHOLESTEROLEMIA: ICD-10-CM

## 2022-07-11 DIAGNOSIS — G89.29 CHRONIC BILATERAL LOW BACK PAIN WITHOUT SCIATICA: ICD-10-CM

## 2022-07-11 DIAGNOSIS — R53.1 RIGHT SIDED WEAKNESS: ICD-10-CM

## 2022-07-11 PROCEDURE — 99214 OFFICE O/P EST MOD 30 MIN: CPT | Performed by: FAMILY MEDICINE

## 2022-07-11 NOTE — PROGRESS NOTES
1. Have you been to the ER, urgent care clinic since your last visit? Hospitalized since your last visit? No    2. Have you seen or consulted any other health care providers outside of the [address] [place] [address] since your last visit? Include any pap smears or colon screening. NO     3. For patients aged 39-70: Has the patient had a colonoscopy / FIT/ Cologuard? 7-8 years ago per patient     If the patient is female:    4. For patients aged 41-77: Has the patient had a mammogram within the past 2 years? NA - based on age or sex      11. For patients aged 21-65: Has the patient had a pap smear? NA - based on age or sex     Reviewed record in preparation for visit and have necessary documentation  Pt did not bring medication to office visit for review  Patient is accompanied by wife I have received verbal consent from Reza Mcgowan to discuss any/all medical information while they are present in the room.     Goals that were addressed and/or need to be completed during or after this appointment include     Health Maintenance Due   Topic Date Due    Hepatitis C Screening  Never done    COVID-19 Vaccine (1) Never done    DTaP/Tdap/Td series (1 - Tdap) Never done    Colorectal Cancer Screening Combo  Never done    Shingrix Vaccine Age 50> (1 of 2) Never done

## 2022-07-12 NOTE — PROGRESS NOTES
Progress Note    Patient: Gabe Enamorado MRN: [id number]  SSN: [id number]    YOB: 1970  Age: 46 y.o. Sex: male        Chief Complaint   Patient presents with    Follow Up Chronic Condition     3 month f/u     he is a 46y.o. year old male who presents for follow up of CVA. Patient with residual right sided weakness. He had been getting outpatient PT. He reports continued improvement. However PT has stopped. He has been out of work since hospitalization. He wants to return to work, however has not made sufficient progress with PT to do so. Feels he would benefit with continued PT. Encounter Diagnoses   Name Primary?  Primary hypertension Yes    Pure hypercholesterolemia     Right sided weakness     History of CVA (cerebrovascular accident)     Chronic bilateral low back pain without sciatica      BP Readings from Last 3 Encounters:   07/11/22 126/87   06/20/22 118/84   04/11/22 130/81     Wt Readings from Last 3 Encounters:   07/11/22 239 lb 6.4 oz (108.6 kg)   06/20/22 244 lb (110.7 kg)   04/11/22 237 lb 6.4 oz (107.7 kg)     Body mass index is 34.35 kg/m².     Lab Results   Component Value Date/Time    WBC 10.5 03/09/2022 12:00 AM    HGB 16.5 03/09/2022 12:00 AM    HCT 49.4 03/09/2022 12:00 AM    PLATELET 977 75/10/1378 12:00 AM    MCV 86 03/09/2022 12:00 AM     Lab Results   Component Value Date/Time    Cholesterol, total 112 03/09/2022 12:00 AM    HDL Cholesterol 33 (L) 03/09/2022 12:00 AM    LDL, calculated 52 03/09/2022 12:00 AM    Triglyceride 155 (H) 03/09/2022 12:00 AM     Lab Results   Component Value Date/Time    TSH 3.340 03/09/2022 12:00 AM      Lab Results   Component Value Date/Time    Sodium 140 03/09/2022 12:00 AM    Potassium 4.5 03/09/2022 12:00 AM    Chloride 101 03/09/2022 12:00 AM    CO2 24 03/09/2022 12:00 AM    Anion gap 4 (L) 07/15/2009 11:20 PM    Glucose 85 03/09/2022 12:00 AM    BUN 10 03/09/2022 12:00 AM    Creatinine 0.99 03/09/2022 12:00 AM    BUN/Creatinine ratio 10 03/09/2022 12:00 AM    GFR est AA >60 07/15/2009 11:20 PM    GFR est non-AA >60 07/15/2009 11:20 PM    Calcium 10.1 03/09/2022 12:00 AM    Bilirubin, total 0.7 03/09/2022 12:00 AM    ALT (SGPT) 50 (H) 03/09/2022 12:00 AM    Alk. phosphatase 88 03/09/2022 12:00 AM    Protein, total 7.3 03/09/2022 12:00 AM    Albumin 4.6 03/09/2022 12:00 AM    Globulin 2.7 07/15/2009 11:20 PM    A-G Ratio 1.7 03/09/2022 12:00 AM      Lab Results   Component Value Date/Time    Hemoglobin A1c 5.5 03/09/2022 12:00 AM        Patient Active Problem List   Diagnosis Code    Migraine without aura, without mention of intractable migraine without mention of status migrainosus G43.009    Lacunar infarction (HCC) I63.81    Primary hypertension I10    History of CVA (cerebrovascular accident) Z86.73    Right sided weakness R53.1    Pure hypercholesterolemia E78.00    Class 2 obesity due to excess calories without serious comorbidity with body mass index (BMI) of 35.0 to 35.9 in adult E66.09, Z68.35     Past Surgical History:   Procedure Laterality Date    HX CHOLECYSTECTOMY       Social History     Socioeconomic History    Marital status:      Spouse name: Not on file    Number of children: Not on file    Years of education: Not on file    Highest education level: Not on file   Occupational History    Not on file   Tobacco Use    Smoking status: Never Smoker    Smokeless tobacco: Not on file   Substance and Sexual Activity    Alcohol use: No    Drug use: Not on file    Sexual activity: Not on file   Other Topics Concern    Not on file   Social History Narrative    Not on file     Social Determinants of Health     Financial Resource Strain:     Difficulty of Paying Living Expenses: Not on file   Food Insecurity:     Worried About Running Out of Food in the Last Year: Not on file    Eddie of Food in the Last Year: Not on file   Transportation Needs:     Lack of Transportation (Medical):  Not on file    Lack of Transportation (Non-Medical): Not on file   Physical Activity:     Days of Exercise per Week: Not on file    Minutes of Exercise per Session: Not on file   Stress:     Feeling of Stress : Not on file   Social Connections:     Frequency of Communication with Friends and Family: Not on file    Frequency of Social Gatherings with Friends and Family: Not on file    Attends [employer] Services: Not on file    Active Member of [address] [place] [address] or Organizations: Not on file    Attends Club or Organization Meetings: Not on file    Marital Status: Not on file   Intimate Partner Violence:     Fear of Current or Ex-Partner: Not on file    Emotionally Abused: Not on file    Physically Abused: Not on file    Sexually Abused: Not on file   Housing Stability:     Unable to Pay for Housing in the Last Year: Not on file    Number of [place] in the Last Year: Not on file    Unstable Housing in the Last Year: Not on file     No family history on file. Current Outpatient Medications   Medication Sig    metoprolol tartrate (LOPRESSOR) 25 mg tablet Take 0.5 Tablets by mouth two (2) times a day.  clopidogreL (PLAVIX) 75 mg tab Take 1 Tablet by mouth daily.  losartan (COZAAR) 25 mg tablet Take 1 Tablet by mouth daily.  atorvastatin (LIPITOR) 40 mg tablet Take 1 Tablet by mouth daily. No current facility-administered medications for this visit.      Allergies   Allergen Reactions    Hydrocodone Other (comments)     headache       Review of Systems:  Constitutional: Negative for fatigue, malaise  Resp: Negative for cough, wheezing or SOB  CV: Negative for chest pain, dizziness or palpitations  GI: Negative for nausea or abdominal pain  MS: see HPI  Neuro: see HPI  Psych: Negative for depression or anxiety     Vitals:    07/11/22 1514 07/11/22 1517   BP: (!) 124/92 126/87   Pulse: (!) 107    Resp: 16    Temp: 98 °F (36.7 °C)    TempSrc: Oral    SpO2: 97%    Weight: 239 lb 6.4 oz (108.6 kg) 239 lb 6.4 oz (108.6 kg) Height: 5' 10\" (1.778 m)        Physical Examination:  General: Well developed, well nourished, in no acute distress  Head: Normocephalic, atraumatic  Eyes: Sclera clear, EOMI  Neck: Normal range of motion  Respiratory: Symmetrical, unlabored effort  Cardiovascular: Regular rate and rhythm  Extremities: Full range of motion, normal gait  Back: b/l lumbar paraspinal muscle TTP  Neurologic: Right sided UE weakness  Psych: Active, alert and oriented. Affect appropriate       ICD-10-CM ICD-9-CM    1. Primary hypertension  I10 401.9    2. Pure hypercholesterolemia  E78.00 272.0    3. Right sided weakness  R53.1 728.87 REFERRAL TO PHYSICAL THERAPY   4. History of CVA (cerebrovascular accident)  Z86.73 V12.54 REFERRAL TO PHYSICAL THERAPY   5. Chronic bilateral low back pain without sciatica  M54.50 724.2 REFERRAL TO PHYSICAL THERAPY    G89.29 338.29        Plan of care:  Diagnoses were discussed in detail with patient. Medications reviewed and appropriate. Patient to continue current prescribed medications as written. Medication risks/benefits/side effects discussed with patient. Importance of compliance with all prescribed medications discussed. All of the patient's questions were addressed and answered to apparent satisfaction. The patient understands and agrees with our plan of care. The patient knows to call back if they have questions about the plan of care or if symptoms change. The patient received an After-Visit Summary which contains VS, diagnoses, orders, allergy and medication lists.       Future Appointments   Date Time Provider Janice Kessler   9/12/2022  2:20 PM Chely Lopez MD [facility]

## 2022-07-19 ENCOUNTER — TELEPHONE (OUTPATIENT)
Dept: FAMILY MEDICINE CLINIC | Age: 52
End: 2022-07-19

## 2022-07-19 NOTE — TELEPHONE ENCOUNTER
Pt wife states that pt insurance only allows 30 visits a year he only has 4 visits left for the year.  What would you like him to do

## 2022-07-20 ENCOUNTER — TELEPHONE (OUTPATIENT)
Dept: FAMILY MEDICINE CLINIC | Age: 52
End: 2022-07-20

## 2022-07-20 NOTE — TELEPHONE ENCOUNTER
Attempted to call. No answer. Message left. Message needs to be clarified, insurance allows only 30 visits/year for what?

## 2022-07-20 NOTE — TELEPHONE ENCOUNTER
Dr. Susan Doll sent an order over to the [facility] for continued service for the stroke. They are saying that the insurance will not pay for continued service. Please check and see what is going on because he needs the Physical Therapy and they cannot afford to pay out of pocket as the therapist suggested. According to them they only have four appointments left from the original order? Please let her know the status?

## 2022-07-21 ENCOUNTER — TELEPHONE (OUTPATIENT)
Dept: FAMILY MEDICINE CLINIC | Age: 52
End: 2022-07-21

## 2022-07-21 NOTE — TELEPHONE ENCOUNTER
Returned call to Silvia Rai with [facility]. She stated patient has 3 remaining visits through [facility] and then [address] will review and may or may not extend visits. Silvia Rai stated patient's plan renews 12-1-22 and there is nothing else that can be done until then. [place] plan does cover home health PT for 90 visits. Silvia Rai stated she will contact office after they receive information.

## 2022-08-11 ENCOUNTER — PATIENT MESSAGE (OUTPATIENT)
Dept: FAMILY MEDICINE CLINIC | Age: 52
End: 2022-08-11

## 2022-08-11 NOTE — TELEPHONE ENCOUNTER
\"Question for Dr Elisa Richey;  I have the beginning of a sinus & ear infection , i was caught in rain last week while trying to get in house. This happens every time i get wet & chilled , starts with a stuffy nose drains into my ears causing stopped up ears and increasing ear ache , that usually creates sorethroat drainage that turnes into bronchitis. I have been trying to head this off with plain mucinex , zinc & vitamin c , but its starting to worsen. As I have said this has been a reoccurring issue for years & can sometimes head it off ,but was hoping Dr Elisa Richey could call in some augmention (works best) for me as I dont have an appt. Until sept 12th. Thank you, please advise me your decision. ABDIAS Smith\"      Patient called, appointment scheduled.

## 2022-08-12 ENCOUNTER — OFFICE VISIT (OUTPATIENT)
Dept: FAMILY MEDICINE CLINIC | Age: 52
End: 2022-08-12
Payer: COMMERCIAL

## 2022-08-12 VITALS
TEMPERATURE: 98.4 F | DIASTOLIC BLOOD PRESSURE: 88 MMHG | BODY MASS INDEX: 34.07 KG/M2 | WEIGHT: 238 LBS | RESPIRATION RATE: 18 BRPM | HEIGHT: 70 IN | OXYGEN SATURATION: 95 % | HEART RATE: 95 BPM | SYSTOLIC BLOOD PRESSURE: 135 MMHG

## 2022-08-12 DIAGNOSIS — R53.1 RIGHT SIDED WEAKNESS: ICD-10-CM

## 2022-08-12 DIAGNOSIS — I10 PRIMARY HYPERTENSION: ICD-10-CM

## 2022-08-12 DIAGNOSIS — I63.81 LACUNAR INFARCTION (HCC): ICD-10-CM

## 2022-08-12 DIAGNOSIS — J01.90 ACUTE RHINOSINUSITIS: Primary | ICD-10-CM

## 2022-08-12 PROCEDURE — 99214 OFFICE O/P EST MOD 30 MIN: CPT | Performed by: FAMILY MEDICINE

## 2022-08-12 RX ORDER — PREDNISONE 20 MG/1
20 TABLET ORAL 2 TIMES DAILY
Qty: 10 TABLET | Refills: 0 | Status: SHIPPED | OUTPATIENT
Start: 2022-08-12 | End: 2022-09-20

## 2022-08-12 RX ORDER — AMOXICILLIN AND CLAVULANATE POTASSIUM 875; 125 MG/1; MG/1
1 TABLET, FILM COATED ORAL 2 TIMES DAILY
Qty: 20 TABLET | Refills: 0 | Status: SHIPPED | OUTPATIENT
Start: 2022-08-12 | End: 2022-08-22

## 2022-08-12 NOTE — PROGRESS NOTES
1. Have you been to the ER, urgent care clinic since your last visit? Hospitalized since your last visit? No    2. Have you seen or consulted any other health care providers outside of the [address] [place] [address] since your last visit? Include any pap smears or colon screening. No    3. For patients aged 39-70: Has the patient had a colonoscopy / FIT/ Cologuard? Yes close to 10 years ago per pt. Dr Marcia Meza     If the patient is female:    4. For patients aged 41-77: Has the patient had a mammogram within the past 2 years? NA - based on age or sex      11. For patients aged 21-65: Has the patient had a pap smear? NA - based on age or sex     Reviewed record in preparation for visit and have necessary documentation  Pt did not bring medication to office visit for review  Patient is accompanied by self I have received verbal consent from Karen Alvarez to discuss any/all medical information while they are present in the room.     Goals that were addressed and/or need to be completed during or after this appointment include     Health Maintenance Due   Topic Date Due    Hepatitis C Screening  Never done    COVID-19 Vaccine (1) Never done    DTaP/Tdap/Td series (1 - Tdap) Never done    Colorectal Cancer Screening Combo  Never done    Shingrix Vaccine Age 50> (1 of 2) Never done

## 2022-08-16 NOTE — PROGRESS NOTES
Patient: Ranjeet Alfaro MRN: [id number]  SSN: [id number]    YOB: 1970  Age: 45 y.o. Sex: male      Chief Complaint   Patient presents with    Cold Symptoms     Pt complaint of nasal congestion, ear pain/pressure, thick mucus      Ranjeet Alfaro is a 45 y.o. male presents with complaints of congestion, dry cough, and bilateral ear pressure for 3 days. There has been no nausea . he has not had  myalgias and fever. Symptoms are moderate. Patient is drinking plenty of fluids. There is not a hx of asthma. There is not a hx of allergic rhinitis. There is not a hx of tobacco use. Patient with hx of HTN, HLD and CVA with residual right sided weakness. BP Readings from Last 3 Encounters:   08/12/22 135/88   07/11/22 126/87   06/20/22 118/84     Wt Readings from Last 3 Encounters:   08/12/22 238 lb (108 kg)   07/11/22 239 lb 6.4 oz (108.6 kg)   06/20/22 244 lb (110.7 kg)     Body mass index is 34.15 kg/m². Medications:     Current Outpatient Medications   Medication Sig    amoxicillin-clavulanate (AUGMENTIN) 875-125 mg per tablet Take 1 Tablet by mouth two (2) times a day for 10 days. predniSONE (DELTASONE) 20 mg tablet Take 1 Tablet by mouth two (2) times a day. metoprolol tartrate (LOPRESSOR) 25 mg tablet TAKE 1/2 TABLET BY MOUTH TWICE DAILY    clopidogreL (PLAVIX) 75 mg tab Take 1 Tablet by mouth daily. losartan (COZAAR) 25 mg tablet Take 1 Tablet by mouth daily. atorvastatin (LIPITOR) 40 mg tablet Take 1 Tablet by mouth daily. No current facility-administered medications for this visit.        Problem List:     Patient Active Problem List    Diagnosis Date Noted    Lacunar infarction ([facility] [address] 02/23/2022    Primary hypertension 02/23/2022    History of CVA (cerebrovascular accident) 02/23/2022    Right sided weakness 02/23/2022    Pure hypercholesterolemia 02/23/2022    Class 2 obesity due to excess calories without serious comorbidity with body mass index (BMI) of 35.0 to 35.9 in adult 02/23/2022    Migraine without aura, without mention of intractable migraine without mention of status migrainosus 05/31/2013       Medical History:     Past Medical History:   Diagnosis Date    Hypertension     Migraines     Stroke [facility])        Allergies:      Allergies   Allergen Reactions    Hydrocodone Other (comments)     headache       Surgical History:     Past Surgical History:   Procedure Laterality Date    HX CHOLECYSTECTOMY         Social History:     Social History     Socioeconomic History    Marital status:    Tobacco Use    Smoking status: Never   Substance and Sexual Activity    Alcohol use: No       Review of Symptoms:  Constitutional: c/o malaise, denies fever or chills  Skin: Negative for rash or lesion  Head: Negative for facial swelling or tenderness  Eyes: Negative for redness or discharge  Ears: Negative for otalgia or decreased hearing  Nose: c/o nasal congestion, denies sinus pressure  Neck: c/o sore throat, denies lymphadenopathy   Cardiovascular: Negative for chest pain or palpitations  Respiratory: c/o non-productive cough, denies wheezing or SOB  Gastrointestinal: Negative for nausea or abdominal pain  Neurologic: Negative for headache or dizziness      Visit Vitals  /88   Pulse 95   Temp 98.4 °F (36.9 °C) (Oral)   Resp 18   Ht 5' 10\" (1.778 m)   Wt 238 lb (108 kg)   SpO2 95%   BMI 34.15 kg/m²       Physical Examination:  General: Well developed, well nourished, in no acute distress  Skin: Warm and dry sans rash or lesion  Head: Normocephalic, atraumatic  Eyes: Sclera clear, EOMI, PERRL  Ears: tympanic membranes normal in appearance  Nose: mucosal edema with rhinorrhea  Oropharynx: posterior erythema, no exudate   Neck: Normal range of motion, no lymphadenopathy  Cardiovascular: normal S1, S2, regular rate and rhythm  Respiratory: Clear to auscultation bilaterally with symmetrical, unlabored effort  Extremities: Right sided UE weakness  Neurologic: Active, alert and oriented      Diagnoses and all orders for this visit:    1. Acute rhinosinusitis  -     amoxicillin-clavulanate (AUGMENTIN) 875-125 mg per tablet; Take 1 Tablet by mouth two (2) times a day for 10 days. -     predniSONE (DELTASONE) 20 mg tablet; Take 1 Tablet by mouth two (2) times a day. 2. Primary hypertension    3. Lacunar infarction ([address]    4. Right sided weakness        Plan of Care:  Symptomatic therapy suggested: rest, increase fluids, gargle prn for sore throat, and call prn if symptoms persist or worsen. Diagnoses were discussed in detail with patient. Advised patient that upper respiratory symptoms can last 1-2 weeks and a cough can persist beyond that time. Medication risks/benefits/side effects discussed with patient. All of the patient's questions were addressed and answered to apparent satisfaction. The patient understands and agrees with our plan of care. The patient knows to call back if they have questions about the plan of care or if symptoms change. The patient received an After-Visit Summary which contains VS, diagnoses, orders, allergy and medication lists.       Future Appointments   Date Time Provider Janice Kessler   9/12/2022  2:20 PM Ana Haney MD [facility]

## 2022-08-17 DIAGNOSIS — E78.00 PURE HYPERCHOLESTEROLEMIA: ICD-10-CM

## 2022-08-17 DIAGNOSIS — I10 PRIMARY HYPERTENSION: ICD-10-CM

## 2022-08-17 DIAGNOSIS — Z86.73 HISTORY OF CVA (CEREBROVASCULAR ACCIDENT): ICD-10-CM

## 2022-08-17 RX ORDER — CLOPIDOGREL BISULFATE 75 MG/1
75 TABLET ORAL DAILY
Qty: 90 TABLET | Refills: 1 | Status: SHIPPED | OUTPATIENT
Start: 2022-08-17

## 2022-08-17 RX ORDER — METOPROLOL TARTRATE 25 MG/1
TABLET, FILM COATED ORAL
Qty: 45 TABLET | Refills: 1 | Status: SHIPPED | OUTPATIENT
Start: 2022-08-17 | End: 2022-10-16

## 2022-08-17 RX ORDER — ATORVASTATIN CALCIUM 40 MG/1
40 TABLET, FILM COATED ORAL DAILY
Qty: 90 TABLET | Refills: 1 | Status: SHIPPED | OUTPATIENT
Start: 2022-08-17

## 2022-08-17 RX ORDER — LOSARTAN POTASSIUM 25 MG/1
25 TABLET ORAL DAILY
Qty: 90 TABLET | Refills: 1 | Status: SHIPPED | OUTPATIENT
Start: 2022-08-17

## 2022-08-17 NOTE — TELEPHONE ENCOUNTER
Need need new scripts for 90 days with 1 Refill. Joselin Arreola [address]73 know why the Pharmacy change messed up everything. This was already done for Gio.  Now it is [facility].

## 2022-08-24 ENCOUNTER — TELEPHONE (OUTPATIENT)
Dept: FAMILY MEDICINE CLINIC | Age: 52
End: 2022-08-24

## 2022-08-24 NOTE — TELEPHONE ENCOUNTER
Pt's wife states the insurance and physical therapy are telling her that they do not have any information for pt's continuation of care. Please advise.

## 2022-08-26 NOTE — TELEPHONE ENCOUNTER
Called [facility], spoke with Alphonse. She stated office is waiting to hear back from Mimi to see if they will pay for visits. Alphonse stated patient is still getting therapy.

## 2022-08-26 NOTE — TELEPHONE ENCOUNTER
Called patient. He was advised Camille Rocha with [facility] stated office is waiting to hear back from Julyna to see if they will pay for visits. Spoke with wife and advised of the same.

## 2022-09-19 ENCOUNTER — TELEPHONE (OUTPATIENT)
Dept: FAMILY MEDICINE CLINIC | Age: 52
End: 2022-09-19

## 2022-09-19 NOTE — TELEPHONE ENCOUNTER
Pt states he has a sinus infection. Pt states he has had this going on for almost 4 years that if he gets a chill he gets an infection and that Dr Maryana Garcia is aware of this. I offered pt an appt for 9-20, but Pt would like for nurse to contact him today. Please advise.

## 2022-09-19 NOTE — TELEPHONE ENCOUNTER
Called patient. He stated he has had b/l ear pain and sinus drng since Friday and knows he has a sinus infx now. Patient asking for an abx or virtual visit.  He was reminded of 9-20-22

## 2022-09-20 ENCOUNTER — OFFICE VISIT (OUTPATIENT)
Dept: FAMILY MEDICINE CLINIC | Age: 52
End: 2022-09-20
Payer: COMMERCIAL

## 2022-09-20 VITALS
OXYGEN SATURATION: 98 % | BODY MASS INDEX: 32.61 KG/M2 | HEIGHT: 70 IN | RESPIRATION RATE: 18 BRPM | TEMPERATURE: 98.5 F | WEIGHT: 227.8 LBS | HEART RATE: 110 BPM | SYSTOLIC BLOOD PRESSURE: 109 MMHG | DIASTOLIC BLOOD PRESSURE: 75 MMHG

## 2022-09-20 DIAGNOSIS — Z86.73 HISTORY OF CVA (CEREBROVASCULAR ACCIDENT): ICD-10-CM

## 2022-09-20 DIAGNOSIS — R53.1 RIGHT SIDED WEAKNESS: ICD-10-CM

## 2022-09-20 DIAGNOSIS — J01.90 ACUTE RHINOSINUSITIS: Primary | ICD-10-CM

## 2022-09-20 DIAGNOSIS — I10 PRIMARY HYPERTENSION: ICD-10-CM

## 2022-09-20 DIAGNOSIS — J30.89 ENVIRONMENTAL AND SEASONAL ALLERGIES: ICD-10-CM

## 2022-09-20 DIAGNOSIS — E66.9 OBESITY, CLASS I, BMI 30-34.9: ICD-10-CM

## 2022-09-20 PROCEDURE — 99214 OFFICE O/P EST MOD 30 MIN: CPT | Performed by: FAMILY MEDICINE

## 2022-09-20 RX ORDER — MONTELUKAST SODIUM 10 MG/1
10 TABLET ORAL DAILY
Qty: 30 TABLET | Refills: 2 | Status: SHIPPED | OUTPATIENT
Start: 2022-09-20

## 2022-09-20 RX ORDER — CEFDINIR 300 MG/1
300 CAPSULE ORAL 2 TIMES DAILY
Qty: 20 CAPSULE | Refills: 0 | Status: SHIPPED | OUTPATIENT
Start: 2022-09-20 | End: 2022-09-30

## 2022-09-20 RX ORDER — LEVOCETIRIZINE DIHYDROCHLORIDE 5 MG/1
5 TABLET, FILM COATED ORAL DAILY
Qty: 30 TABLET | Refills: 2 | Status: SHIPPED | OUTPATIENT
Start: 2022-09-20

## 2022-09-20 NOTE — PROGRESS NOTES
1. Have you been to the ER, urgent care clinic since your last visit? Hospitalized since your last visit? No    2. Have you seen or consulted any other health care providers outside of the [address] [place] [address] since your last visit? Include any pap smears or colon screening. No    3. For patients aged 39-70: Has the patient had a colonoscopy / FIT/ Cologuard? No    If the patient is female:    4. For patients aged 41-77: Has the patient had a mammogram within the past 2 years? NA - based on age or sex      11. For patients aged 21-65: Has the patient had a pap smear? NA - based on age or sex     Reviewed record in preparation for visit and have necessary documentation  Pt did not bring medication to office visit for review  Patient is accompanied by wife I have received verbal consent from Lexy Avalos to discuss any/all medical information while they are present in the room.     Goals that were addressed and/or need to be completed during or after this appointment include     Health Maintenance Due   Topic Date Due    Hepatitis C Screening  Never done    COVID-19 Vaccine (1) Never done    DTaP/Tdap/Td series (1 - Tdap) Never done    Colorectal Cancer Screening Combo  Never done    Shingrix Vaccine Age 50> (1 of 2) Never done    Flu Vaccine (1) Never done

## 2022-09-20 NOTE — PROGRESS NOTES
Patient: Humberto Campos MRN: [id number]  SSN: [id number]    YOB: 1970  Age: 45 y.o. Sex: male      Chief Complaint   Patient presents with    Sinus Infection     Sinus infection pt got chilled and started with ear pain/pressure in head and ears, drainage      Humberto Campos is a 45 y.o. male presents with complaints of congestion, dry cough, and bilateral ear pressure for 3 days. There has been no nausea . he has not had myalgias and fever. Symptoms are moderate. Patient with similar symptoms last month. There is a hx of allergic rhinitis. There is not a hx of tobacco use. Patient with hx of HTN, HLD and CVA with residual right sided weakness. He is still getting PT though insurance coverage is a concern. Patient has lost weight. BP Readings from Last 3 Encounters:   09/20/22 109/75   08/12/22 135/88   07/11/22 126/87     Wt Readings from Last 3 Encounters:   09/20/22 227 lb 12.8 oz (103.3 kg)   08/12/22 238 lb (108 kg)   07/11/22 239 lb 6.4 oz (108.6 kg)     Body mass index is 32.69 kg/m². Medications:     Current Outpatient Medications   Medication Sig    levocetirizine (XYZAL) 5 mg tablet Take 1 Tablet by mouth daily. montelukast (SINGULAIR) 10 mg tablet Take 1 Tablet by mouth daily. cefdinir (OMNICEF) 300 mg capsule Take 1 Capsule by mouth two (2) times a day for 10 days. metoprolol tartrate (LOPRESSOR) 25 mg tablet TAKE 1/2 TABLET BY MOUTH TWICE DAILY    atorvastatin (LIPITOR) 40 mg tablet Take 1 Tablet by mouth daily. losartan (COZAAR) 25 mg tablet Take 1 Tablet by mouth daily. clopidogreL (PLAVIX) 75 mg tab Take 1 Tablet by mouth daily. No current facility-administered medications for this visit.        Problem List:     Patient Active Problem List    Diagnosis Date Noted    Lacunar infarction [facility]) 02/23/2022    Primary hypertension 02/23/2022    History of CVA (cerebrovascular accident) 02/23/2022    Right sided weakness 02/23/2022    Pure hypercholesterolemia 02/23/2022    Class 2 obesity due to excess calories without serious comorbidity with body mass index (BMI) of 35.0 to 35.9 in adult 02/23/2022    Migraine without aura, without mention of intractable migraine without mention of status migrainosus 05/31/2013       Medical History:     Past Medical History:   Diagnosis Date    Hypertension     Migraines     Stroke [facility])        Allergies:      Allergies   Allergen Reactions    Hydrocodone Other (comments)     headache       Surgical History:     Past Surgical History:   Procedure Laterality Date    HX CHOLECYSTECTOMY         Social History:     Social History     Socioeconomic History    Marital status:    Tobacco Use    Smoking status: Never   Substance and Sexual Activity    Alcohol use: No       Review of Symptoms:  Constitutional: Negative for malaise, fever or chills  Skin: Negative for rash or lesion  Head: Negative for facial swelling or tenderness  Eyes: Negative for redness or discharge  Ears: c/o ear pressure  Nose: c/o nasal congestion, denies sinus pressure  Neck: c/o sore throat, denies lymphadenopathy   Cardiovascular: Negative for chest pain or palpitations  Respiratory: c/o non-productive cough, denies wheezing or SOB  Gastrointestinal: Negative for nausea or abdominal pain  Neurologic: Negative for headache or dizziness      Visit Vitals  /75 (BP 1 Location: Left upper arm, BP Patient Position: Sitting, BP Cuff Size: Large adult)   Pulse (!) 110   Temp 98.5 °F (36.9 °C) (Oral)   Resp 18   Ht 5' 10\" (1.778 m)   Wt 227 lb 12.8 oz (103.3 kg)   SpO2 98%   BMI 32.69 kg/m²       Physical Examination:  General: Well developed, well nourished, in no acute distress  Skin: Warm and dry sans rash or lesion  Head: Normocephalic, atraumatic  Eyes: Sclera clear, EOMI  Ears: tympanic membranes normal in appearance  Nose: mucosal edema with rhinorrhea  Oropharynx: posterior erythema, no exudate   Neck: Normal range of motion, no lymphadenopathy  Cardiovascular: normal S1, S2, regular rate and rhythm  Respiratory: Clear to auscultation bilaterally with symmetrical, unlabored effort  Extremities: Right sided UE weakness  Neurologic: Active, alert and oriented      Diagnoses and all orders for this visit:    1. Acute rhinosinusitis  -     cefdinir (OMNICEF) 300 mg capsule; Take 1 Capsule by mouth two (2) times a day for 10 days. 2. Environmental and seasonal allergies  -     levocetirizine (XYZAL) 5 mg tablet; Take 1 Tablet by mouth daily. -     montelukast (SINGULAIR) 10 mg tablet; Take 1 Tablet by mouth daily. 3. History of CVA (cerebrovascular accident)    4. Right sided weakness    5. Primary hypertension    6. Obesity, Class I, BMI 30-34.9    Other orders  -     pneumococcal 20-maida conj-dip, PF, (PREVNAR 20) 0.5 mL syrg injection; 0.5 mL by IntraMUSCular route once for 1 dose. Plan of Care:  Symptomatic therapy suggested: rest, increase fluids, gargle prn for sore throat, and call prn if symptoms persist or worsen. Diagnoses were discussed in detail with patient. Medication risks/benefits/side effects discussed with patient. All of the patient's questions were addressed and answered to apparent satisfaction. The patient understands and agrees with our plan of care. The patient knows to call back if they have questions about the plan of care or if symptoms change. The patient received an After-Visit Summary which contains VS, diagnoses, orders, allergy and medication lists.       Future Appointments   Date Time Provider Janice Kessler   10/3/2022  2:00 PM Michelle Ruano MD [facility]

## 2022-10-03 ENCOUNTER — OFFICE VISIT (OUTPATIENT)
Dept: FAMILY MEDICINE CLINIC | Age: 52
End: 2022-10-03
Payer: COMMERCIAL

## 2022-10-03 VITALS
HEART RATE: 105 BPM | HEIGHT: 70 IN | SYSTOLIC BLOOD PRESSURE: 124 MMHG | OXYGEN SATURATION: 95 % | RESPIRATION RATE: 18 BRPM | BODY MASS INDEX: 33.82 KG/M2 | TEMPERATURE: 98.2 F | DIASTOLIC BLOOD PRESSURE: 86 MMHG | WEIGHT: 236.2 LBS

## 2022-10-03 DIAGNOSIS — E78.00 PURE HYPERCHOLESTEROLEMIA: ICD-10-CM

## 2022-10-03 DIAGNOSIS — I10 PRIMARY HYPERTENSION: Primary | ICD-10-CM

## 2022-10-03 DIAGNOSIS — Z86.73 HISTORY OF CVA (CEREBROVASCULAR ACCIDENT): ICD-10-CM

## 2022-10-03 PROCEDURE — 99214 OFFICE O/P EST MOD 30 MIN: CPT | Performed by: FAMILY MEDICINE

## 2022-10-03 NOTE — PROGRESS NOTES
1. Have you been to the ER, urgent care clinic since your last visit? Hospitalized since your last visit? No    2. Have you seen or consulted any other health care providers outside of the [address] [place] [address] since your last visit? Include any pap smears or colon screening. No    3. For patients aged 39-70: Has the patient had a colonoscopy / FIT/ Cologuard? No    If the patient is female:    4. For patients aged 41-77: Has the patient had a mammogram within the past 2 years? NA - based on age or sex      11. For patients aged 21-65: Has the patient had a pap smear? NA - based on age or sex     Reviewed record in preparation for visit and have necessary documentation  Pt did not bring medication to office visit for review  Patient is accompanied by wife I have received verbal consent from Oscar Bejarano to discuss any/all medical information while they are present in the room.     Goals that were addressed and/or need to be completed during or after this appointment include     Health Maintenance Due   Topic Date Due    Hepatitis C Screening  Never done    COVID-19 Vaccine (1) Never done    DTaP/Tdap/Td series (1 - Tdap) Never done    Colorectal Cancer Screening Combo  Never done    Shingrix Vaccine Age 50> (1 of 2) Never done    Flu Vaccine (1) Never done

## 2022-10-07 LAB
ALBUMIN SERPL-MCNC: 4.1 G/DL (ref 3.5–5)
ALBUMIN/GLOB SERPL: 1.3 {RATIO} (ref 1.1–2.2)
ALP SERPL-CCNC: 93 U/L (ref 45–117)
ALT SERPL-CCNC: 66 U/L (ref 12–78)
ANION GAP SERPL CALC-SCNC: 6 MMOL/L (ref 5–15)
AST SERPL-CCNC: 33 U/L (ref 15–37)
BILIRUB SERPL-MCNC: 1.1 MG/DL (ref 0.2–1)
BUN SERPL-MCNC: 12 MG/DL (ref 6–20)
BUN/CREAT SERPL: 11 (ref 12–20)
CALCIUM SERPL-MCNC: 9.5 MG/DL (ref 8.5–10.1)
CHLORIDE SERPL-SCNC: 106 MMOL/L (ref 97–108)
CHOLEST SERPL-MCNC: 132 MG/DL
CO2 SERPL-SCNC: 28 MMOL/L (ref 21–32)
CREAT SERPL-MCNC: 1.06 MG/DL (ref 0.7–1.3)
ERYTHROCYTE [DISTWIDTH] IN BLOOD BY AUTOMATED COUNT: 13.2 % (ref 11.5–14.5)
GLOBULIN SER CALC-MCNC: 3.1 G/DL (ref 2–4)
GLUCOSE SERPL-MCNC: 99 MG/DL (ref 65–100)
HCT VFR BLD AUTO: 47 % (ref 36.6–50.3)
HDLC SERPL-MCNC: 37 MG/DL
HDLC SERPL: 3.6 {RATIO} (ref 0–5)
HGB BLD-MCNC: 15.2 G/DL (ref 12.1–17)
LDLC SERPL CALC-MCNC: 34 MG/DL (ref 0–100)
MCH RBC QN AUTO: 30.2 PG (ref 26–34)
MCHC RBC AUTO-ENTMCNC: 32.3 G/DL (ref 30–36.5)
MCV RBC AUTO: 93.4 FL (ref 80–99)
NRBC # BLD: 0 K/UL (ref 0–0.01)
NRBC BLD-RTO: 0 PER 100 WBC
PLATELET # BLD AUTO: 223 K/UL (ref 150–400)
PMV BLD AUTO: 9.9 FL (ref 8.9–12.9)
POTASSIUM SERPL-SCNC: 4.2 MMOL/L (ref 3.5–5.1)
PROT SERPL-MCNC: 7.2 G/DL (ref 6.4–8.2)
RBC # BLD AUTO: 5.03 M/UL (ref 4.1–5.7)
SODIUM SERPL-SCNC: 140 MMOL/L (ref 136–145)
TRIGL SERPL-MCNC: 305 MG/DL (ref ?–150)
TSH SERPL DL<=0.05 MIU/L-ACNC: 2.56 UIU/ML (ref 0.36–3.74)
VLDLC SERPL CALC-MCNC: 61 MG/DL
WBC # BLD AUTO: 9 K/UL (ref 4.1–11.1)

## 2022-10-10 NOTE — PROGRESS NOTES
Progress Note    Patient: Roby Aponte MRN: [id number]  SSN: [id number]    YOB: 1970  Age: 45 y.o. Sex: male        Chief Complaint   Patient presents with    Follow-up     2 month follow up      he is a 45y.o. year old male who presents for follow up of CVA. Patient with residual right sided weakness. He had been getting outpatient PT. He reports continued improvement. However PT will be stopped after next encounter. He has been out of work since hospitalization for CVA. He wants to return to work. Encounter Diagnoses   Name Primary? Primary hypertension Yes    History of CVA (cerebrovascular accident)     Pure hypercholesterolemia      BP Readings from Last 3 Encounters:   10/03/22 124/86   09/20/22 109/75   08/12/22 135/88     Wt Readings from Last 3 Encounters:   10/03/22 236 lb 3.2 oz (107.1 kg)   09/20/22 227 lb 12.8 oz (103.3 kg)   08/12/22 238 lb (108 kg)     Body mass index is 33.89 kg/m².     Lab Results   Component Value Date/Time    WBC 9.0 10/06/2022 09:40 AM    HGB 15.2 10/06/2022 09:40 AM    HCT 47.0 10/06/2022 09:40 AM    PLATELET 903 91/19/6651 09:40 AM    MCV 93.4 10/06/2022 09:40 AM     Lab Results   Component Value Date/Time    Cholesterol, total 132 10/06/2022 09:40 AM    HDL Cholesterol 37 10/06/2022 09:40 AM    LDL, calculated 34 10/06/2022 09:40 AM    Triglyceride 305 (H) 10/06/2022 09:40 AM    CHOL/HDL Ratio 3.6 10/06/2022 09:40 AM     Lab Results   Component Value Date/Time    TSH 2.56 10/06/2022 09:40 AM      Lab Results   Component Value Date/Time    Sodium 140 10/06/2022 09:40 AM    Potassium 4.2 10/06/2022 09:40 AM    Chloride 106 10/06/2022 09:40 AM    CO2 28 10/06/2022 09:40 AM    Anion gap 6 10/06/2022 09:40 AM    Glucose 99 10/06/2022 09:40 AM    BUN 12 10/06/2022 09:40 AM    Creatinine 1.06 10/06/2022 09:40 AM    BUN/Creatinine ratio 11 (L) 10/06/2022 09:40 AM    GFR est AA >60 07/15/2009 11:20 PM    GFR est non-AA >60 07/15/2009 11:20 PM    Calcium 9.5 10/06/2022 09:40 AM    Bilirubin, total 1.1 (H) 10/06/2022 09:40 AM    ALT (SGPT) 66 10/06/2022 09:40 AM    Alk. phosphatase 93 10/06/2022 09:40 AM    Protein, total 7.2 10/06/2022 09:40 AM    Albumin 4.1 10/06/2022 09:40 AM    Globulin 3.1 10/06/2022 09:40 AM    A-G Ratio 1.3 10/06/2022 09:40 AM      Lab Results   Component Value Date/Time    Hemoglobin A1c 5.5 03/09/2022 12:00 AM        Patient Active Problem List   Diagnosis Code    Migraine without aura, without mention of intractable migraine without mention of status migrainosus G43.009    Lacunar infarction (HCC) I63.81    Primary hypertension I10    History of CVA (cerebrovascular accident) Z86.73    Right sided weakness R53.1    Pure hypercholesterolemia E78.00    Class 2 obesity due to excess calories without serious comorbidity with body mass index (BMI) of 35.0 to 35.9 in adult E66.09, Z68.35     Past Surgical History:   Procedure Laterality Date    HX CHOLECYSTECTOMY       Social History     Socioeconomic History    Marital status:      Spouse name: Not on file    Number of children: Not on file    Years of education: Not on file    Highest education level: Not on file   Occupational History    Not on file   Tobacco Use    Smoking status: Never    Smokeless tobacco: Not on file   Substance and Sexual Activity    Alcohol use: No    Drug use: Not on file    Sexual activity: Not on file   Other Topics Concern    Not on file   Social History Narrative    Not on file     Social Determinants of Health     Financial Resource Strain: Not on file   Food Insecurity: Not on file   Transportation Needs: Not on file   Physical Activity: Not on file   Stress: Not on file   Social Connections: Not on file   Intimate Partner Violence: Not on file   Housing Stability: Not on file     No family history on file. Current Outpatient Medications   Medication Sig    levocetirizine (XYZAL) 5 mg tablet Take 1 Tablet by mouth daily.     montelukast (SINGULAIR) 10 mg tablet Take 1 Tablet by mouth daily. metoprolol tartrate (LOPRESSOR) 25 mg tablet TAKE 1/2 TABLET BY MOUTH TWICE DAILY    atorvastatin (LIPITOR) 40 mg tablet Take 1 Tablet by mouth daily. losartan (COZAAR) 25 mg tablet Take 1 Tablet by mouth daily. clopidogreL (PLAVIX) 75 mg tab Take 1 Tablet by mouth daily. No current facility-administered medications for this visit. Allergies   Allergen Reactions    Hydrocodone Other (comments)     headache       Review of Systems:  Constitutional: Negative for fatigue, malaise  Resp: Negative for cough, wheezing or SOB  CV: Negative for chest pain, dizziness or palpitations  GI: Negative for nausea or abdominal pain  MS: see HPI  Neuro: see HPI  Psych: Negative for depression or anxiety     Vitals:    10/03/22 1405 10/03/22 1418   BP: (!) 131/94 124/86   Pulse: (!) 105    Resp: 18    Temp: 98.2 °F (36.8 °C)    TempSrc: Oral    SpO2: 95%    Weight: 236 lb 3.2 oz (107.1 kg)    Height: 5' 10\" (1.778 m)        Physical Examination:  General: Well developed, well nourished, in no acute distress  Head: Normocephalic, atraumatic  Eyes: Sclera clear, EOMI  Neck: Normal range of motion  Respiratory: Symmetrical, unlabored effort  Cardiovascular: Regular rate and rhythm  Extremities: Full range of motion, normal gait  Back: b/l lumbar paraspinal muscle TTP  Neurologic: Right sided UE weakness  Psych: Active, alert and oriented. Affect appropriate       ICD-10-CM ICD-9-CM    1. Primary hypertension  I10 401.9 LIPID PANEL      METABOLIC PANEL, COMPREHENSIVE      TSH 3RD GENERATION      CBC W/O DIFF      CBC W/O DIFF      TSH 3RD GENERATION      METABOLIC PANEL, COMPREHENSIVE      LIPID PANEL      2. History of CVA (cerebrovascular accident)  Z86.73 V12.54       3. Pure hypercholesterolemia  E78.00 272.0 LIPID PANEL      LIPID PANEL          Plan of care:  Diagnoses were discussed in detail with patient. Medications reviewed and appropriate.    Patient to continue current prescribed medications as written. Medication risks/benefits/side effects discussed with patient. Importance of compliance with all prescribed medications discussed. All of the patient's questions were addressed and answered to apparent satisfaction. The patient understands and agrees with our plan of care. The patient knows to call back if they have questions about the plan of care or if symptoms change. The patient received an After-Visit Summary which contains VS, diagnoses, orders, allergy and medication lists. Future Appointments   Date Time Provider Janice Kessler   11/14/2022  3:00 PM Jacklyn Roberts MD [facility]           Follow-up and Dispositions    Return in about 6 weeks (around 11/14/2022).

## 2022-11-14 ENCOUNTER — OFFICE VISIT (OUTPATIENT)
Dept: FAMILY MEDICINE CLINIC | Age: 52
End: 2022-11-14
Payer: COMMERCIAL

## 2022-11-14 VITALS
BODY MASS INDEX: 33.61 KG/M2 | TEMPERATURE: 98.1 F | OXYGEN SATURATION: 95 % | WEIGHT: 234.8 LBS | HEIGHT: 70 IN | SYSTOLIC BLOOD PRESSURE: 126 MMHG | DIASTOLIC BLOOD PRESSURE: 87 MMHG | HEART RATE: 101 BPM | RESPIRATION RATE: 18 BRPM

## 2022-11-14 DIAGNOSIS — Z86.73 HISTORY OF CVA (CEREBROVASCULAR ACCIDENT): ICD-10-CM

## 2022-11-14 DIAGNOSIS — I10 PRIMARY HYPERTENSION: Primary | ICD-10-CM

## 2022-11-14 DIAGNOSIS — R53.1 RIGHT SIDED WEAKNESS: ICD-10-CM

## 2022-11-14 DIAGNOSIS — E78.00 PURE HYPERCHOLESTEROLEMIA: ICD-10-CM

## 2022-11-14 PROCEDURE — 3078F DIAST BP <80 MM HG: CPT | Performed by: FAMILY MEDICINE

## 2022-11-14 PROCEDURE — 99214 OFFICE O/P EST MOD 30 MIN: CPT | Performed by: FAMILY MEDICINE

## 2022-11-14 PROCEDURE — 3074F SYST BP LT 130 MM HG: CPT | Performed by: FAMILY MEDICINE

## 2022-11-14 RX ORDER — METHOCARBAMOL 500 MG/1
500 TABLET, FILM COATED ORAL
Qty: 30 TABLET | Refills: 0 | Status: SHIPPED | OUTPATIENT
Start: 2022-11-14

## 2022-11-14 NOTE — PROGRESS NOTES
1. Have you been to the ER, urgent care clinic since your last visit? Hospitalized since your last visit? No    2. Have you seen or consulted any other health care providers outside of the [address] [place] [address] since your last visit? Include any pap smears or colon screening. No    3. For patients aged 39-70: Has the patient had a colonoscopy / FIT/ Cologuard? 10-11 years ago per pt. If the patient is female:    4. For patients aged 41-77: Has the patient had a mammogram within the past 2 years? NA - based on age or sex      11. For patients aged 21-65: Has the patient had a pap smear? NA - based on age or sex     Reviewed record in preparation for visit and have necessary documentation  Pt did not bring medication to office visit for review  Patient is accompanied by wife I have received verbal consent from Alphonse Fan to discuss any/all medical information while they are present in the room.     Goals that were addressed and/or need to be completed during or after this appointment include     Health Maintenance Due   Topic Date Due    Hepatitis C Screening  Never done    COVID-19 Vaccine (1) Never done    DTaP/Tdap/Td series (1 - Tdap) Never done    Colorectal Cancer Screening Combo  Never done    Shingrix Vaccine Age 50> (1 of 2) Never done    Flu Vaccine (1) Never done

## 2022-11-14 NOTE — LETTER
NOTIFICATION RETURN TO WORK     11/14/2022 4:15 PM    Mr. Sonya Martines  [address] [address]      To Whom It May Concern:    Sonya Martines is currently under the care of Na Bird. He is cleared to return to work on a limited basis. Please start 1/2 day weekly and increase the number of days weekly as tolerated. If there are questions or concerns please have the patient contact our office.         Sincerely,      Allegra Vyas MD

## 2022-12-04 DIAGNOSIS — E78.00 PURE HYPERCHOLESTEROLEMIA: ICD-10-CM

## 2022-12-05 RX ORDER — LOSARTAN POTASSIUM 25 MG/1
TABLET ORAL
Qty: 90 TABLET | Refills: 1 | Status: SHIPPED | OUTPATIENT
Start: 2022-12-05

## 2022-12-12 ENCOUNTER — OFFICE VISIT (OUTPATIENT)
Dept: FAMILY MEDICINE CLINIC | Age: 52
End: 2022-12-12
Payer: COMMERCIAL

## 2022-12-12 VITALS
HEART RATE: 92 BPM | OXYGEN SATURATION: 95 % | HEIGHT: 70 IN | WEIGHT: 233 LBS | TEMPERATURE: 98.1 F | BODY MASS INDEX: 33.36 KG/M2 | RESPIRATION RATE: 16 BRPM | SYSTOLIC BLOOD PRESSURE: 124 MMHG | DIASTOLIC BLOOD PRESSURE: 81 MMHG

## 2022-12-12 DIAGNOSIS — Z23 ENCOUNTER FOR IMMUNIZATION: ICD-10-CM

## 2022-12-12 DIAGNOSIS — I10 PRIMARY HYPERTENSION: Primary | ICD-10-CM

## 2022-12-12 DIAGNOSIS — Z86.73 HISTORY OF CVA (CEREBROVASCULAR ACCIDENT): ICD-10-CM

## 2022-12-12 DIAGNOSIS — E78.5 DYSLIPIDEMIA: ICD-10-CM

## 2022-12-12 DIAGNOSIS — J30.89 ENVIRONMENTAL AND SEASONAL ALLERGIES: ICD-10-CM

## 2022-12-12 RX ORDER — MONTELUKAST SODIUM 10 MG/1
10 TABLET ORAL DAILY
Qty: 90 TABLET | Refills: 1 | Status: SHIPPED | OUTPATIENT
Start: 2022-12-12

## 2022-12-12 RX ORDER — LEVOCETIRIZINE DIHYDROCHLORIDE 5 MG/1
5 TABLET, FILM COATED ORAL DAILY
Qty: 90 TABLET | Refills: 1 | Status: SHIPPED | OUTPATIENT
Start: 2022-12-12

## 2022-12-12 NOTE — PROGRESS NOTES
1. \"Have you been to the ER, urgent care clinic since your last visit? Hospitalized since your last visit? \" No    2. \"Have you seen or consulted any other health care providers outside of the [address] [place] [address] since your last visit? \" No     3. For patients aged 39-70: Has the patient had a colonoscopy / FIT/ Cologuard? Yes - Care Gap present. Rooming MA/LPN to request most recent results      If the patient is female:    4. For patients aged 41-77: Has the patient had a mammogram within the past 2 years? NA - based on age or sex      11. For patients aged 21-65: Has the patient had a pap smear?  NA - based on age or sex    Health Maintenance Due   Topic Date Due    Hepatitis C Screening  Never done    COVID-19 Vaccine (1) Never done    DTaP/Tdap/Td series (1 - Tdap) Never done    Colorectal Cancer Screening Combo  Never done    Shingrix Vaccine Age 50> (1 of 2) Never done    Flu Vaccine (1) Never done

## 2022-12-12 NOTE — LETTER
12/12/2022 4:21 PM    Mr. Kemi Guevara  [address] [place] [id number]          RECORDS REQUEST        Dear Medical Records,     Please fax us the most recent COLONOSCOPY REPORT so that we may update the patient's records for continuity of care.      Our fax number: [phone]       Patient:   Courtney Ravi  1970              Sincerely,    DAMIAN MERCER & HONORIO DONG [facility] [place]

## 2022-12-19 NOTE — PROGRESS NOTES
Progress Note    Patient: Missy Silva MRN: [id number]  SSN: [id number]    YOB: 1970  Age: 45 y.o. Sex: male        Chief Complaint   Patient presents with    Follow-up     1 month      he is a 45y.o. year old male who presents for follow up of CVA. Patient with residual right sided weakness. He has returned  to work for a limited number of half days. He says feels he can increase his work hours. BP well controlled. Weight stable. Cholesterol well controlled, though triglycerides remain elevated. He complains of seasonal allergies. Encounter Diagnoses   Name Primary? Primary hypertension Yes    Dyslipidemia     Environmental and seasonal allergies     History of CVA (cerebrovascular accident)     Encounter for immunization      BP Readings from Last 3 Encounters:   12/12/22 124/81   11/14/22 126/87   10/03/22 124/86     Wt Readings from Last 3 Encounters:   12/12/22 233 lb (105.7 kg)   11/14/22 234 lb 12.8 oz (106.5 kg)   10/03/22 236 lb 3.2 oz (107.1 kg)     Body mass index is 33.43 kg/m².     Lab Results   Component Value Date/Time    WBC 9.0 10/06/2022 09:40 AM    HGB 15.2 10/06/2022 09:40 AM    HCT 47.0 10/06/2022 09:40 AM    PLATELET 788 91/72/7439 09:40 AM    MCV 93.4 10/06/2022 09:40 AM     Lab Results   Component Value Date/Time    Cholesterol, total 132 10/06/2022 09:40 AM    HDL Cholesterol 37 10/06/2022 09:40 AM    LDL, calculated 34 10/06/2022 09:40 AM    Triglyceride 305 (H) 10/06/2022 09:40 AM    CHOL/HDL Ratio 3.6 10/06/2022 09:40 AM     Lab Results   Component Value Date/Time    TSH 2.56 10/06/2022 09:40 AM      Lab Results   Component Value Date/Time    Sodium 140 10/06/2022 09:40 AM    Potassium 4.2 10/06/2022 09:40 AM    Chloride 106 10/06/2022 09:40 AM    CO2 28 10/06/2022 09:40 AM    Anion gap 6 10/06/2022 09:40 AM    Glucose 99 10/06/2022 09:40 AM    BUN 12 10/06/2022 09:40 AM    Creatinine 1.06 10/06/2022 09:40 AM    BUN/Creatinine ratio 11 (L) 10/06/2022 09:40 AM GFR est AA >60 07/15/2009 11:20 PM    GFR est non-AA >60 07/15/2009 11:20 PM    Calcium 9.5 10/06/2022 09:40 AM    Bilirubin, total 1.1 (H) 10/06/2022 09:40 AM    ALT (SGPT) 66 10/06/2022 09:40 AM    Alk.  phosphatase 93 10/06/2022 09:40 AM    Protein, total 7.2 10/06/2022 09:40 AM    Albumin 4.1 10/06/2022 09:40 AM    Globulin 3.1 10/06/2022 09:40 AM    A-G Ratio 1.3 10/06/2022 09:40 AM      Lab Results   Component Value Date/Time    Hemoglobin A1c 5.5 03/09/2022 12:00 AM        Patient Active Problem List   Diagnosis Code    Migraine without aura, without mention of intractable migraine without mention of status migrainosus G43.009    Lacunar infarction (HCC) I63.81    Primary hypertension I10    History of CVA (cerebrovascular accident) Z86.73    Right sided weakness R53.1    Pure hypercholesterolemia E78.00    Class 2 obesity due to excess calories without serious comorbidity with body mass index (BMI) of 35.0 to 35.9 in adult E66.09, Z68.35     Past Surgical History:   Procedure Laterality Date    HX CHOLECYSTECTOMY       Social History     Socioeconomic History    Marital status:      Spouse name: Not on file    Number of children: Not on file    Years of education: Not on file    Highest education level: Not on file   Occupational History    Not on file   Tobacco Use    Smoking status: Never     Passive exposure: Never    Smokeless tobacco: Never   Substance and Sexual Activity    Alcohol use: No    Drug use: Not on file    Sexual activity: Not on file   Other Topics Concern    Not on file   Social History Narrative    Not on file     Social Determinants of Health     Financial Resource Strain: Unknown    Difficulty of Paying Living Expenses: Patient refused   Food Insecurity: No Food Insecurity    Worried About Running Out of Food in the Last Year: Never true    Ran Out of Food in the Last Year: Never true   Transportation Needs: Not on file   Physical Activity: Not on file   Stress: Not on file Social Connections: Not on file   Intimate Partner Violence: Not on file   Housing Stability: Not on file     No family history on file. Current Outpatient Medications   Medication Sig    montelukast (SINGULAIR) 10 mg tablet Take 1 Tablet by mouth daily. levocetirizine (XYZAL) 5 mg tablet Take 1 Tablet by mouth daily. losartan (COZAAR) 25 mg tablet TAKE 1 TABLET BY MOUTH EVERY DAY    methocarbamoL (ROBAXIN) 500 mg tablet Take 1 Tablet by mouth three (3) times daily as needed for Muscle Spasm(s). metoprolol tartrate (LOPRESSOR) 25 mg tablet TAKE 1/2 TABLET BY MOUTH TWICE DAILY    atorvastatin (LIPITOR) 40 mg tablet Take 1 Tablet by mouth daily. clopidogreL (PLAVIX) 75 mg tab Take 1 Tablet by mouth daily. No current facility-administered medications for this visit. Allergies   Allergen Reactions    Hydrocodone Other (comments)     headache       Review of Systems:  Constitutional: Negative for fatigue, malaise  Resp: Negative for cough, wheezing or SOB  CV: Negative for chest pain, dizziness or palpitations  GI: Negative for nausea or abdominal pain  MS: see HPI  Neuro: see HPI  Psych: Negative for depression or anxiety     Vitals:    12/12/22 1344 12/12/22 1436   BP: (!) 133/90 124/81   Pulse: 92    Resp: 16    Temp: 98.1 °F (36.7 °C)    TempSrc: Oral    SpO2: 95%    Weight: 233 lb (105.7 kg)    Height: 5' 10\" (1.778 m)        Physical Examination:  General: Well developed, well nourished, in no acute distress  Head: Normocephalic, atraumatic  Eyes: Sclera clear, EOMI  Neck: Normal range of motion  Respiratory: Symmetrical, unlabored effort  Cardiovascular: Regular rate and rhythm  Extremities: Full range of motion, normal gait  Neurologic: Right sided UE weakness  Psych: Active, alert and oriented. Affect appropriate       ICD-10-CM ICD-9-CM    1. Primary hypertension  I10 401.9       2. Dyslipidemia  E78.5 272.4       3.  Environmental and seasonal allergies  J30.89 477.8 montelukast (SINGULAIR) 10 mg tablet      levocetirizine (XYZAL) 5 mg tablet      4. History of CVA (cerebrovascular accident)  Z86.73 V12.54       5. Encounter for immunization  Z23 V03.89 INFLUENZA, FLUARIX, FLULAVAL, FLUZONE (AGE 6 MO+), AFLURIA(AGE 3Y+) IM, PF, 0.5 ML      PA IMMUNIZ ADMIN,1 SINGLE/COMB VAC/TOXOID          Plan of care:  Diagnoses were discussed in detail with patient. Medications reviewed and appropriate. Patient to continue current prescribed medications as written. Medication risks/benefits/side effects discussed with patient. Importance of compliance with all prescribed medications discussed. All of the patient's questions were addressed and answered to apparent satisfaction. The patient understands and agrees with our plan of care. The patient knows to call back if they have questions about the plan of care or if symptoms change. The patient received an After-Visit Summary which contains VS, diagnoses, orders, allergy and medication lists. Future Appointments   Date Time Provider Janice Kessler   4/12/2023  2:00 PM Katty Mayberry MD [facility]           Follow-up and Dispositions    Return in about 4 months (around 4/12/2023).

## 2022-12-29 ENCOUNTER — TELEPHONE (OUTPATIENT)
Dept: FAMILY MEDICINE CLINIC | Age: 52
End: 2022-12-29

## 2023-01-04 ENCOUNTER — TELEPHONE (OUTPATIENT)
Dept: FAMILY MEDICINE CLINIC | Age: 53
End: 2023-01-04

## 2023-01-04 NOTE — TELEPHONE ENCOUNTER
Pt's wife state that [facility]  faxed over some paperwork that needed to be filled out. She is asking if this has been done, states she needs this sent in today. Please advise.

## 2023-01-04 NOTE — TELEPHONE ENCOUNTER
Called patient's wife. She was advised paperwork rec'd and once completed will fax back. Verbalized understanding.

## 2023-01-16 NOTE — PROGRESS NOTES
Progress Note    Patient: Oscar Bejarano MRN: [id number]  SSN: [id number]    YOB: 1970  Age: 45 y.o. Sex: male        Chief Complaint   Patient presents with    Follow-up     6 week follow up, discuss return to work and statin drug      he is a 45y.o. year old male who presents for follow up of CVA. Patient with residual right sided weakness. He has been out of work since hospitalization for his CVA. He wants to return to work. Encounter Diagnoses   Name Primary? Primary hypertension Yes    Pure hypercholesterolemia     History of CVA (cerebrovascular accident)     Right sided weakness      BP Readings from Last 3 Encounters:   11/14/22 126/87   10/03/22 124/86   09/20/22 109/75     Wt Readings from Last 3 Encounters:   11/14/22 234 lb 12.8 oz (106.5 kg)   10/03/22 236 lb 3.2 oz (107.1 kg)   09/20/22 227 lb 12.8 oz (103.3 kg)     Body mass index is 33.69 kg/m².     Lab Results   Component Value Date/Time    WBC 9.0 10/06/2022 09:40 AM    HGB 15.2 10/06/2022 09:40 AM    HCT 47.0 10/06/2022 09:40 AM    PLATELET 867 88/18/3569 09:40 AM    MCV 93.4 10/06/2022 09:40 AM     Lab Results   Component Value Date/Time    Cholesterol, total 132 10/06/2022 09:40 AM    HDL Cholesterol 37 10/06/2022 09:40 AM    LDL, calculated 34 10/06/2022 09:40 AM    Triglyceride 305 (H) 10/06/2022 09:40 AM    CHOL/HDL Ratio 3.6 10/06/2022 09:40 AM     Lab Results   Component Value Date/Time    TSH 2.56 10/06/2022 09:40 AM      Lab Results   Component Value Date/Time    Sodium 140 10/06/2022 09:40 AM    Potassium 4.2 10/06/2022 09:40 AM    Chloride 106 10/06/2022 09:40 AM    CO2 28 10/06/2022 09:40 AM    Anion gap 6 10/06/2022 09:40 AM    Glucose 99 10/06/2022 09:40 AM    BUN 12 10/06/2022 09:40 AM    Creatinine 1.06 10/06/2022 09:40 AM    BUN/Creatinine ratio 11 (L) 10/06/2022 09:40 AM    GFR est AA >60 07/15/2009 11:20 PM    GFR est non-AA >60 07/15/2009 11:20 PM    Calcium 9.5 10/06/2022 09:40 AM    Bilirubin, total 1.1 (H) 10/06/2022 09:40 AM    ALT (SGPT) 66 10/06/2022 09:40 AM    Alk. phosphatase 93 10/06/2022 09:40 AM    Protein, total 7.2 10/06/2022 09:40 AM    Albumin 4.1 10/06/2022 09:40 AM    Globulin 3.1 10/06/2022 09:40 AM    A-G Ratio 1.3 10/06/2022 09:40 AM      Lab Results   Component Value Date/Time    Hemoglobin A1c 5.5 03/09/2022 12:00 AM        Patient Active Problem List   Diagnosis Code    Migraine without aura, without mention of intractable migraine without mention of status migrainosus G43.009    Lacunar infarction (HCC) I63.81    Primary hypertension I10    History of CVA (cerebrovascular accident) Z86.73    Right sided weakness R53.1    Pure hypercholesterolemia E78.00    Class 2 obesity due to excess calories without serious comorbidity with body mass index (BMI) of 35.0 to 35.9 in adult E66.09, Z68.35     Past Surgical History:   Procedure Laterality Date    HX CHOLECYSTECTOMY       Social History     Socioeconomic History    Marital status:      Spouse name: Not on file    Number of children: Not on file    Years of education: Not on file    Highest education level: Not on file   Occupational History    Not on file   Tobacco Use    Smoking status: Never    Smokeless tobacco: Not on file   Substance and Sexual Activity    Alcohol use: No    Drug use: Not on file    Sexual activity: Not on file   Other Topics Concern    Not on file   Social History Narrative    Not on file     Social Determinants of Health     Financial Resource Strain: Not on file   Food Insecurity: Not on file   Transportation Needs: Not on file   Physical Activity: Not on file   Stress: Not on file   Social Connections: Not on file   Intimate Partner Violence: Not on file   Housing Stability: Not on file     History reviewed. No pertinent family history. Current Outpatient Medications   Medication Sig    methocarbamoL (ROBAXIN) 500 mg tablet Take 1 Tablet by mouth three (3) times daily as needed for Muscle Spasm(s).     metoprolol tartrate (LOPRESSOR) 25 mg tablet TAKE 1/2 TABLET BY MOUTH TWICE DAILY    levocetirizine (XYZAL) 5 mg tablet Take 1 Tablet by mouth daily. montelukast (SINGULAIR) 10 mg tablet Take 1 Tablet by mouth daily. atorvastatin (LIPITOR) 40 mg tablet Take 1 Tablet by mouth daily. losartan (COZAAR) 25 mg tablet Take 1 Tablet by mouth daily. clopidogreL (PLAVIX) 75 mg tab Take 1 Tablet by mouth daily. No current facility-administered medications for this visit. Allergies   Allergen Reactions    Hydrocodone Other (comments)     headache       Review of Systems:  Constitutional: Negative for fatigue, malaise  Resp: Negative for cough, wheezing or SOB  CV: Negative for chest pain, dizziness or palpitations  GI: Negative for nausea or abdominal pain  MS: see HPI  Neuro: see HPI  Psych: Negative for depression or anxiety     Vitals:    11/14/22 1510 11/14/22 1535   BP: (!) 150/91 126/87   Pulse: (!) 101    Resp: 18    Temp: 98.1 °F (36.7 °C)    TempSrc: Oral    SpO2: 95%    Weight: 234 lb 12.8 oz (106.5 kg)    Height: 5' 10\" (1.778 m)        Physical Examination:  General: Well developed, well nourished, in no acute distress  Head: Normocephalic, atraumatic  Eyes: Sclera clear, EOMI  Neck: Normal range of motion  Respiratory: Symmetrical, unlabored effort  Cardiovascular: Regular rate and rhythm  Extremities: Full range of motion, normal gait  Neurologic: Right sided UE weakness  Psych: Active, alert and oriented. Affect appropriate       ICD-10-CM ICD-9-CM    1. Primary hypertension  I10 401.9       2. Pure hypercholesterolemia  E78.00 272.0       3. History of CVA (cerebrovascular accident)  Z86.73 V12.54       4. Right sided weakness  R53.1 728.87           Plan of care:  Diagnoses were discussed in detail with patient. Medications reviewed and appropriate. Patient to continue current prescribed medications as written. Medication risks/benefits/side effects discussed with patient.    Importance of compliance with all prescribed medications discussed. All of the patient's questions were addressed and answered to apparent satisfaction. The patient understands and agrees with our plan of care. The patient knows to call back if they have questions about the plan of care or if symptoms change. The patient received an After-Visit Summary which contains VS, diagnoses, orders, allergy and medication lists. Future Appointments   Date Time Provider Janice Kessler   12/12/2022  1:40 PM Josafat Baron MD [facility]           Follow-up and Dispositions    Return in about 4 weeks (around 12/12/2022). Stage 10: Additional Anesthesia Type: 1% lidocaine with epinephrine

## 2023-03-10 DIAGNOSIS — I10 PRIMARY HYPERTENSION: ICD-10-CM

## 2023-03-10 RX ORDER — ATORVASTATIN CALCIUM 40 MG/1
TABLET, FILM COATED ORAL
Qty: 90 TABLET | Refills: 1 | Status: SHIPPED | OUTPATIENT
Start: 2023-03-10

## 2023-03-15 DIAGNOSIS — Z86.73 HISTORY OF CVA (CEREBROVASCULAR ACCIDENT): ICD-10-CM

## 2023-03-16 RX ORDER — CLOPIDOGREL BISULFATE 75 MG/1
TABLET ORAL
Qty: 90 TABLET | Refills: 1 | Status: SHIPPED | OUTPATIENT
Start: 2023-03-16

## 2023-04-12 ENCOUNTER — OFFICE VISIT (OUTPATIENT)
Dept: FAMILY MEDICINE CLINIC | Age: 53
End: 2023-04-12
Payer: COMMERCIAL

## 2023-04-12 VITALS
BODY MASS INDEX: 32.78 KG/M2 | TEMPERATURE: 97.9 F | HEART RATE: 96 BPM | RESPIRATION RATE: 19 BRPM | DIASTOLIC BLOOD PRESSURE: 83 MMHG | WEIGHT: 229 LBS | SYSTOLIC BLOOD PRESSURE: 139 MMHG | HEIGHT: 70 IN | OXYGEN SATURATION: 96 %

## 2023-04-12 DIAGNOSIS — G89.29 CHRONIC BILATERAL LOW BACK PAIN WITHOUT SCIATICA: ICD-10-CM

## 2023-04-12 DIAGNOSIS — E78.5 DYSLIPIDEMIA: ICD-10-CM

## 2023-04-12 DIAGNOSIS — M54.50 CHRONIC BILATERAL LOW BACK PAIN WITHOUT SCIATICA: ICD-10-CM

## 2023-04-12 DIAGNOSIS — R53.1 RIGHT SIDED WEAKNESS: Primary | ICD-10-CM

## 2023-04-12 DIAGNOSIS — Z86.73 HISTORY OF CVA (CEREBROVASCULAR ACCIDENT): ICD-10-CM

## 2023-04-12 DIAGNOSIS — I10 PRIMARY HYPERTENSION: ICD-10-CM

## 2023-04-12 PROCEDURE — 3078F DIAST BP <80 MM HG: CPT | Performed by: FAMILY MEDICINE

## 2023-04-12 PROCEDURE — 3074F SYST BP LT 130 MM HG: CPT | Performed by: FAMILY MEDICINE

## 2023-04-12 PROCEDURE — 99214 OFFICE O/P EST MOD 30 MIN: CPT | Performed by: FAMILY MEDICINE

## 2023-04-12 RX ORDER — METHOCARBAMOL 500 MG/1
500 TABLET, FILM COATED ORAL
Qty: 90 TABLET | Refills: 1 | Status: SHIPPED | OUTPATIENT
Start: 2023-04-12

## 2023-04-18 ENCOUNTER — TELEPHONE (OUTPATIENT)
Dept: FAMILY MEDICINE CLINIC | Age: 53
End: 2023-04-18

## 2023-04-18 NOTE — TELEPHONE ENCOUNTER
[employer] states they have faxed over on 03/07/23, 03/21/23 and 04/04/23 - Pt has opened a claim for Social Security Disability for Dr. Avis waller. They do not need medical records at all. They only need comments from Dr. Amparo Salter. They state they have gotten no response from us at all. Fax [phone].

## 2023-04-19 NOTE — PROGRESS NOTES
Progress Note    Patient: Jennifer Guzman MRN: [id number]  SSN: [id number]    YOB: 1970  Age: 45 y.o. Sex: male        Chief Complaint   Patient presents with    Follow Up Chronic Condition     he is a 45y.o. year old male who presents for follow up of CVA. Patient with residual right sided weakness. He has returned  to work for two half days a week. He has to work longer hours to try to complete a half day of work. He is concerned he will lose his job because of his limitations and the fact that there has been no improvement. He asks for referral back to PT. BP well controlled. Weight stable. Cholesterol well controlled, though triglycerides elevated. Encounter Diagnoses   Name Primary? Right sided weakness Yes    History of CVA (cerebrovascular accident)     Chronic bilateral low back pain without sciatica     Primary hypertension     Dyslipidemia      BP Readings from Last 3 Encounters:   04/12/23 139/83   12/12/22 124/81   11/14/22 126/87     Wt Readings from Last 3 Encounters:   04/12/23 229 lb (103.9 kg)   12/12/22 233 lb (105.7 kg)   11/14/22 234 lb 12.8 oz (106.5 kg)     Body mass index is 32.86 kg/m².     Lab Results   Component Value Date/Time    WBC 9.0 10/06/2022 09:40 AM    HGB 15.2 10/06/2022 09:40 AM    HCT 47.0 10/06/2022 09:40 AM    PLATELET 381 92/34/8712 09:40 AM    MCV 93.4 10/06/2022 09:40 AM     Lab Results   Component Value Date/Time    Cholesterol, total 132 10/06/2022 09:40 AM    HDL Cholesterol 37 10/06/2022 09:40 AM    LDL, calculated 34 10/06/2022 09:40 AM    Triglyceride 305 (H) 10/06/2022 09:40 AM    CHOL/HDL Ratio 3.6 10/06/2022 09:40 AM     Lab Results   Component Value Date/Time    TSH 2.56 10/06/2022 09:40 AM      Lab Results   Component Value Date/Time    Sodium 140 10/06/2022 09:40 AM    Potassium 4.2 10/06/2022 09:40 AM    Chloride 106 10/06/2022 09:40 AM    CO2 28 10/06/2022 09:40 AM    Anion gap 6 10/06/2022 09:40 AM    Glucose 99 10/06/2022 09:40 AM BUN 12 10/06/2022 09:40 AM    Creatinine 1.06 10/06/2022 09:40 AM    BUN/Creatinine ratio 11 (L) 10/06/2022 09:40 AM    GFR est AA >60 07/15/2009 11:20 PM    GFR est non-AA >60 07/15/2009 11:20 PM    Calcium 9.5 10/06/2022 09:40 AM    Bilirubin, total 1.1 (H) 10/06/2022 09:40 AM    ALT (SGPT) 66 10/06/2022 09:40 AM    Alk.  phosphatase 93 10/06/2022 09:40 AM    Protein, total 7.2 10/06/2022 09:40 AM    Albumin 4.1 10/06/2022 09:40 AM    Globulin 3.1 10/06/2022 09:40 AM    A-G Ratio 1.3 10/06/2022 09:40 AM      Lab Results   Component Value Date/Time    Hemoglobin A1c 5.5 03/09/2022 12:00 AM        Patient Active Problem List   Diagnosis Code    Migraine without aura, without mention of intractable migraine without mention of status migrainosus G43.009    Lacunar infarction (HCC) I63.81    Primary hypertension I10    History of CVA (cerebrovascular accident) Z86.73    Right sided weakness R53.1    Pure hypercholesterolemia E78.00    Class 2 obesity due to excess calories without serious comorbidity with body mass index (BMI) of 35.0 to 35.9 in adult E66.09, Z68.35     Past Surgical History:   Procedure Laterality Date    HX CHOLECYSTECTOMY       Social History     Socioeconomic History    Marital status:      Spouse name: Not on file    Number of children: Not on file    Years of education: Not on file    Highest education level: Not on file   Occupational History    Not on file   Tobacco Use    Smoking status: Never     Passive exposure: Never    Smokeless tobacco: Never   Vaping Use    Vaping Use: Never used   Substance and Sexual Activity    Alcohol use: No    Drug use: Not Currently    Sexual activity: Not on file   Other Topics Concern    Not on file   Social History Narrative    Not on file     Social Determinants of Health     Financial Resource Strain: Unknown    Difficulty of Paying Living Expenses: Patient refused   Food Insecurity: No Food Insecurity    Worried About Running Out of Food in the Last Year: Never true    Ran Out of Food in the Last Year: Never true   Transportation Needs: Not on file   Physical Activity: Not on file   Stress: Not on file   Social Connections: Not on file   Intimate Partner Violence: Not on file   Housing Stability: Not on file     History reviewed. No pertinent family history. Current Outpatient Medications   Medication Sig    methocarbamoL (ROBAXIN) 500 mg tablet Take 1 Tablet by mouth three (3) times daily as needed for Muscle Spasm(s). clopidogreL (PLAVIX) 75 mg tab TAKE 1 TABLET BY MOUTH EVERY DAY    atorvastatin (LIPITOR) 40 mg tablet TAKE 1 TABLET BY MOUTH EVERY DAY    montelukast (SINGULAIR) 10 mg tablet Take 1 Tablet by mouth daily. levocetirizine (XYZAL) 5 mg tablet Take 1 Tablet by mouth daily. losartan (COZAAR) 25 mg tablet TAKE 1 TABLET BY MOUTH EVERY DAY    metoprolol tartrate (LOPRESSOR) 25 mg tablet TAKE 1/2 TABLET BY MOUTH TWICE DAILY     No current facility-administered medications for this visit. Allergies   Allergen Reactions    Hydrocodone Other (comments)     headache       Review of Systems:  Constitutional: Negative for fatigue, malaise  Resp: Negative for cough, wheezing or SOB  CV: Negative for chest pain, dizziness or palpitations  GI: Negative for nausea or abdominal pain  MS: see HPI  Neuro: see HPI  Psych: Negative for depression or anxiety     Vitals:    04/12/23 1408   BP: 139/83   Pulse: 96   Resp: 19   Temp: 97.9 °F (36.6 °C)   TempSrc: Oral   SpO2: 96%   Weight: 229 lb (103.9 kg)   Height: 5' 10\" (1.778 m)       Physical Examination:  General: Well developed, well nourished, in no acute distress  Head: Normocephalic, atraumatic  Eyes: Sclera clear, EOMI  Neck: Normal range of motion  Respiratory: Symmetrical, unlabored effort  Cardiovascular: Regular rate and rhythm  Extremities: Full range of motion, normal gait  Neurologic: Right sided UE weakness  Psych: Active, alert and oriented.  Affect appropriate       ICD-10-CM ICD-9-CM 1. Right sided weakness  R53.1 728.87 REFERRAL TO PHYSICAL THERAPY      2. History of CVA (cerebrovascular accident)  Z86.73 V12.54       3. Chronic bilateral low back pain without sciatica  M54.50 724.2 methocarbamoL (ROBAXIN) 500 mg tablet    G89.29 338.29 REFERRAL TO PHYSICAL THERAPY      4. Primary hypertension  L20 035.3 METABOLIC PANEL, COMPREHENSIVE      CBC W/O DIFF      TSH 3RD GENERATION      5. Dyslipidemia  E78.5 272.4 LIPID PANEL      METABOLIC PANEL, COMPREHENSIVE          Plan of care:  Diagnoses were discussed in detail with patient. Medications reviewed and appropriate. Patient to continue current prescribed medications as written. Medication risks/benefits/side effects discussed with patient. Importance of compliance with all prescribed medications discussed. All of the patient's questions were addressed and answered to apparent satisfaction. The patient understands and agrees with our plan of care. The patient knows to call back if they have questions about the plan of care or if symptoms change. The patient received an After-Visit Summary which contains VS, diagnoses, orders, allergy and medication lists. Future Appointments   Date Time Provider Janice Kessler   8/14/2023  3:00 PM Elias Mcrae MD [facility]           Follow-up and Dispositions    Return in about 4 months (around 8/12/2023).

## 2023-04-20 NOTE — TELEPHONE ENCOUNTER
[address], spoke with [employer]. She was advised [employer] [facility] does not do disability documentation and the patient's medical records have all the information needed for any disability claim. Sigrid verbalized understanding and stated they do not want any medical records sent to them. She was appreciative of return call.

## 2023-04-20 NOTE — TELEPHONE ENCOUNTER
Please call law firm. [facility] does not do disability documentation. The patient's medical records have all the information needed for any disability claim.

## 2023-05-31 DIAGNOSIS — E78.00 PURE HYPERCHOLESTEROLEMIA, UNSPECIFIED: ICD-10-CM

## 2023-06-02 RX ORDER — LOSARTAN POTASSIUM 25 MG/1
TABLET ORAL
Qty: 90 TABLET | Refills: 1 | Status: SHIPPED | OUTPATIENT
Start: 2023-06-02

## 2023-06-08 DIAGNOSIS — J30.89 OTHER ALLERGIC RHINITIS: ICD-10-CM

## 2023-06-09 RX ORDER — LEVOCETIRIZINE DIHYDROCHLORIDE 5 MG/1
TABLET, FILM COATED ORAL
Qty: 90 TABLET | Refills: 1 | Status: SHIPPED | OUTPATIENT
Start: 2023-06-09

## 2023-06-09 RX ORDER — MONTELUKAST SODIUM 10 MG/1
TABLET ORAL
Qty: 90 TABLET | Refills: 1 | Status: SHIPPED | OUTPATIENT
Start: 2023-06-09

## 2023-07-11 DIAGNOSIS — M54.50 LOW BACK PAIN, UNSPECIFIED: ICD-10-CM

## 2023-07-11 DIAGNOSIS — I10 ESSENTIAL (PRIMARY) HYPERTENSION: ICD-10-CM

## 2023-07-12 RX ORDER — ATORVASTATIN CALCIUM 40 MG/1
TABLET, FILM COATED ORAL
Qty: 90 TABLET | Refills: 1 | Status: SHIPPED | OUTPATIENT
Start: 2023-07-12

## 2023-07-12 RX ORDER — METHOCARBAMOL 500 MG/1
TABLET, FILM COATED ORAL
Qty: 90 TABLET | Refills: 1 | Status: SHIPPED | OUTPATIENT
Start: 2023-07-12

## 2023-08-14 ENCOUNTER — OFFICE VISIT (OUTPATIENT)
Facility: CLINIC | Age: 53
End: 2023-08-14
Payer: COMMERCIAL

## 2023-08-14 VITALS
DIASTOLIC BLOOD PRESSURE: 88 MMHG | HEART RATE: 96 BPM | BODY MASS INDEX: 33.61 KG/M2 | SYSTOLIC BLOOD PRESSURE: 132 MMHG | RESPIRATION RATE: 14 BRPM | TEMPERATURE: 97.6 F | OXYGEN SATURATION: 97 % | WEIGHT: 234.8 LBS | HEIGHT: 70 IN

## 2023-08-14 DIAGNOSIS — I10 ESSENTIAL (PRIMARY) HYPERTENSION: Primary | ICD-10-CM

## 2023-08-14 DIAGNOSIS — E78.00 PURE HYPERCHOLESTEROLEMIA, UNSPECIFIED: ICD-10-CM

## 2023-08-14 DIAGNOSIS — Z86.73 HISTORY OF CVA (CEREBROVASCULAR ACCIDENT): ICD-10-CM

## 2023-08-14 PROCEDURE — 99214 OFFICE O/P EST MOD 30 MIN: CPT | Performed by: FAMILY MEDICINE

## 2023-08-14 PROCEDURE — 3079F DIAST BP 80-89 MM HG: CPT | Performed by: FAMILY MEDICINE

## 2023-08-14 PROCEDURE — 3075F SYST BP GE 130 - 139MM HG: CPT | Performed by: FAMILY MEDICINE

## 2023-08-14 SDOH — ECONOMIC STABILITY: INCOME INSECURITY: HOW HARD IS IT FOR YOU TO PAY FOR THE VERY BASICS LIKE FOOD, HOUSING, MEDICAL CARE, AND HEATING?: NOT HARD AT ALL

## 2023-08-14 SDOH — ECONOMIC STABILITY: HOUSING INSECURITY
IN THE LAST 12 MONTHS, WAS THERE A TIME WHEN YOU DID NOT HAVE A STEADY PLACE TO SLEEP OR SLEPT IN A SHELTER (INCLUDING NOW)?: NO

## 2023-08-14 SDOH — ECONOMIC STABILITY: FOOD INSECURITY: WITHIN THE PAST 12 MONTHS, THE FOOD YOU BOUGHT JUST DIDN'T LAST AND YOU DIDN'T HAVE MONEY TO GET MORE.: NEVER TRUE

## 2023-08-14 SDOH — ECONOMIC STABILITY: FOOD INSECURITY: WITHIN THE PAST 12 MONTHS, YOU WORRIED THAT YOUR FOOD WOULD RUN OUT BEFORE YOU GOT MONEY TO BUY MORE.: NEVER TRUE

## 2023-08-14 ASSESSMENT — PATIENT HEALTH QUESTIONNAIRE - PHQ9
SUM OF ALL RESPONSES TO PHQ9 QUESTIONS 1 & 2: 0
SUM OF ALL RESPONSES TO PHQ QUESTIONS 1-9: 0
2. FEELING DOWN, DEPRESSED OR HOPELESS: 0
SUM OF ALL RESPONSES TO PHQ QUESTIONS 1-9: 0
SUM OF ALL RESPONSES TO PHQ QUESTIONS 1-9: 0
1. LITTLE INTEREST OR PLEASURE IN DOING THINGS: 0
SUM OF ALL RESPONSES TO PHQ QUESTIONS 1-9: 0

## 2023-08-14 NOTE — PROGRESS NOTES
1. \"Have you been to the ER, urgent care clinic since your last visit? Hospitalized since your last visit? \" NO    2. \"Have you seen or consulted any other health care providers outside of the [address] [place] [address] since your last visit? \" no    3. For patients aged 43-73: Has the patient had a colonoscopy / FIT/ Cologuard? YES, 2008/2009       If the patient is female:    4. For patients aged 43-66: Has the patient had a mammogram within the past 2 years? N/A      5. For patients aged 21-65: Has the patient had a pap smear?  N/A    Health Maintenance Due   Topic Date Due    COVID-19 Vaccine (1) Never done    HIV screen  Never done    Hepatitis C screen  Never done    DTaP/Tdap/Td vaccine (1 - Tdap) Never done    Colorectal Cancer Screen  Never done    Shingles vaccine (1 of 2) Never done    Flu vaccine (1) 08/01/2023
understands and agrees with our plan of care. The patient knows to call back if they have questions about the plan of care or if symptoms change. The patient received an After-Visit Summary which contains VS, diagnoses, orders, allergy and medication lists. Future Appointments   Date Time Provider [address]   11/15/2023 10:00 AM Ivy Woods MD [facility]           Follow-up and Dispositions    Return in about 3 months (around 11/1/2023).

## 2023-08-18 ENCOUNTER — TELEPHONE (OUTPATIENT)
Facility: CLINIC | Age: 53
End: 2023-08-18

## 2023-08-18 NOTE — TELEPHONE ENCOUNTER
----- Message from Rodger Cain sent at 8/18/2023  4:57 PM EDT -----  Subject: Referral Request    Reason for referral request? Pt is having continued difficulty swallowing   and eating; he has still been choking when he eats. He would like an order   for additional speech/occupational therapy to help improve that. Provider patient wants to be referred to(if known):     Provider Phone Number(if known): Additional Information for Provider?  They would prefer if pt was referred   to home health care through [facility].   ---------------------------------------------------------------------------  --------------  [address] [place] [address] [place]    [phone]; OK to leave message on voicemail  ---------------------------------------------------------------------------  --------------

## 2023-08-21 ENCOUNTER — TELEPHONE (OUTPATIENT)
Facility: CLINIC | Age: 53
End: 2023-08-21

## 2023-08-21 NOTE — TELEPHONE ENCOUNTER
----- Message from Brittany Perez sent at 8/18/2023  4:53 PM EDT -----  Subject: Appointment Request    Reason for Call: New Patient/New to Provider Appointment needed: New   Patient Request Appointment    QUESTIONS    Reason for appointment request? No appointments available during search     Additional Information for Provider? Pt would like to switch to another   doctor in the practice; they feel that pt's current PCP isn't advocating   for pt's needs. Pt is attempting to apply for disability after having a   stroke. Please advise. They would prefer an apt on a Friday afternoon   towards the end of the day.  Phone # listed is pt's wife's.   ---------------------------------------------------------------------------  --------------  [place] [place]  [phone]; OK to leave message on voicemail  ---------------------------------------------------------------------------  --------------  SCRIPT ANSWERS

## 2023-08-21 NOTE — TELEPHONE ENCOUNTER
Spoke with pt. Pt stated that his wife was the one who called requesting to switch providers but pt states that he does not want to switch.

## 2023-08-30 ENCOUNTER — TELEPHONE (OUTPATIENT)
Facility: CLINIC | Age: 53
End: 2023-08-30

## 2023-09-09 DIAGNOSIS — Z86.73 PERSONAL HISTORY OF TRANSIENT ISCHEMIC ATTACK (TIA), AND CEREBRAL INFARCTION WITHOUT RESIDUAL DEFICITS: ICD-10-CM

## 2023-09-11 RX ORDER — CLOPIDOGREL BISULFATE 75 MG/1
TABLET ORAL
Qty: 90 TABLET | Refills: 1 | Status: SHIPPED | OUTPATIENT
Start: 2023-09-11

## 2023-09-12 DIAGNOSIS — I10 ESSENTIAL (PRIMARY) HYPERTENSION: ICD-10-CM

## 2023-09-12 DIAGNOSIS — J30.89 OTHER ALLERGIC RHINITIS: ICD-10-CM

## 2023-09-12 DIAGNOSIS — M54.50 LOW BACK PAIN, UNSPECIFIED: ICD-10-CM

## 2023-09-13 ENCOUNTER — TELEPHONE (OUTPATIENT)
Facility: CLINIC | Age: 53
End: 2023-09-13

## 2023-09-13 RX ORDER — MONTELUKAST SODIUM 10 MG/1
TABLET ORAL
Qty: 90 TABLET | Refills: 1 | Status: SHIPPED | OUTPATIENT
Start: 2023-09-13

## 2023-09-13 RX ORDER — LEVOCETIRIZINE DIHYDROCHLORIDE 5 MG/1
TABLET, FILM COATED ORAL
Qty: 90 TABLET | Refills: 1 | Status: SHIPPED | OUTPATIENT
Start: 2023-09-13

## 2023-09-13 RX ORDER — METHOCARBAMOL 500 MG/1
TABLET, FILM COATED ORAL
Qty: 90 TABLET | Refills: 1 | Status: SHIPPED | OUTPATIENT
Start: 2023-09-13

## 2023-09-13 NOTE — TELEPHONE ENCOUNTER
Patient called, appt changed to Dr. Chasity Santizo as provider as he is patient's PCP. Patient requesting call back regarding disability paperwork.

## 2023-09-14 NOTE — TELEPHONE ENCOUNTER
Spoke with Herson Vinson, pt's wife, on HIPAA. Herson Vinson made aware that the office typically does short term disability/FMLA paperwork, but for full time disability, the specialist typically fills that out. Advised pt to go back to neurologist. Pt was seen by neurology in 6/22. Phone number to that office given to wife and advised to call and make an appt. Advised her a referral was most likely not needed but if so, advised to call back and a message would be sent to Dr. Tracie Lombard. She verbalized understanding and I apologized for  all the confusion regarding the disability forms.  She was very appreciative of my help

## 2023-09-15 ENCOUNTER — OFFICE VISIT (OUTPATIENT)
Age: 53
End: 2023-09-15
Payer: COMMERCIAL

## 2023-09-15 VITALS
DIASTOLIC BLOOD PRESSURE: 90 MMHG | OXYGEN SATURATION: 98 % | RESPIRATION RATE: 20 BRPM | SYSTOLIC BLOOD PRESSURE: 148 MMHG | HEART RATE: 88 BPM

## 2023-09-15 DIAGNOSIS — M54.50 LUMBAR PAIN: ICD-10-CM

## 2023-09-15 DIAGNOSIS — Z86.73 HISTORY OF CVA (CEREBROVASCULAR ACCIDENT): ICD-10-CM

## 2023-09-15 DIAGNOSIS — R47.89 WORD FINDING DIFFICULTY: ICD-10-CM

## 2023-09-15 DIAGNOSIS — R53.1 RIGHT SIDED WEAKNESS: Primary | ICD-10-CM

## 2023-09-15 PROCEDURE — 99214 OFFICE O/P EST MOD 30 MIN: CPT

## 2023-09-15 PROCEDURE — 3077F SYST BP >= 140 MM HG: CPT

## 2023-09-15 PROCEDURE — 3080F DIAST BP >= 90 MM HG: CPT

## 2023-09-15 ASSESSMENT — ENCOUNTER SYMPTOMS: BACK PAIN: 1

## 2023-09-15 NOTE — PROGRESS NOTES
Has been doing therapy and has not been progressing in therapy like he was hoping   Has done in home health therapy and outpatient therapy multiple times and each time it has helped but overall he is not getting back to where he needs to be to be able to keep his job and function more than 2 days a week at work   His back is giving him issues after the few hours he is on his feet
Pupils are equal, round, and reactive to light and accommodation. Extra-ocular movements are full and fluid. V-XII: Hearing is grossly intact. Facial features are symmetric, with normal sensation and strength. The palate rises symmetrically and the tongue protrudes midline. Motor Examination: Normal tone, bulk, and strength, 3/5 muscle strength upper R arm and 4/5 leg. Left uppers and lowers 5/5. Coordination: No resting or intention tremor    Gait and Station: Slow, but steady while walking. Reduced arm swing. No pronator drift. No muscle wasting or fasiculations noted. Reflexes: DTRs 1 on the right side and 2 on the left side. Neurosensory Exam: Normal to touch, pinprick and vibration. Orders Placed This Encounter    MRI LUMBAR SPINE WO CONTRAST     Standing Status:   Future     Standing Expiration Date:   9/15/2024     Order Specific Question:   Reason for exam:     Answer:   Lower back pain after walking for a while. Order Specific Question:   What is the sedation requirement? Answer:   None    External Referral To Occupational Therapy     Referral Priority:   Routine     Referral Type:   Eval and Treat     Referral Reason:   Specialty Services Required     Requested Specialty:   Occupational Therapy     Number of Visits Requested:   1        1. Right sided weakness    2. Lumbar pain    3. History of CVA (cerebrovascular accident)    4. Word finding difficulty    Patient and his wife bring an abundance of paperwork with them today from [facility] for me to review from the hospital stay from the stroke. Advised them that I will need time to look these records over and they understand. In the meantime, I will send the patient to have an MRI of the lumbar spine as he complains of pain with walking after 3 to 4 hours in his lower back. I will also send him to [facility] for functional capacity evaluation.   We will plan on the patient to follow-up in 3 months to go

## 2023-09-20 ENCOUNTER — TELEPHONE (OUTPATIENT)
Age: 53
End: 2023-09-20

## 2023-09-20 NOTE — TELEPHONE ENCOUNTER
/Patient is requesting a call for when she can go and  medical records that she had hand CORAL Brown at her visit on 09/15. Please contact.

## 2023-09-21 ENCOUNTER — TELEPHONE (OUTPATIENT)
Age: 53
End: 2023-09-21

## 2023-09-21 NOTE — TELEPHONE ENCOUNTER
Called patient to let him know we were finished with his paperwork and that him or his wife could come and get it from the office. Pt stated understanding.

## 2023-09-21 NOTE — TELEPHONE ENCOUNTER
Returned her call to let her know I did speak with her  this morning and the records are ready and up front when they are ready to come and pick them up.

## 2023-10-18 ENCOUNTER — TELEPHONE (OUTPATIENT)
Age: 53
End: 2023-10-18

## 2023-10-18 NOTE — TELEPHONE ENCOUNTER
The patient called back and stated if he needs to be contacted please call him on his cell number and not to use My Chart.

## 2023-10-18 NOTE — TELEPHONE ENCOUNTER
Returned her call. She was not quite sure if they have already submitted something to get an authorization for his MRI or not.  But he got there and it was not authorized yet, he was not going to sign the form saying he would be responsible for payment and he was quite upset and just left since he was not going to wait

## 2023-10-18 NOTE — TELEPHONE ENCOUNTER
Patient is currently in the office to have his MRI done. Mary Anne Barnard is calling because the MRI authorization is still pending review.      Scheduling office for authorization # [id number] option 2

## 2023-10-20 DIAGNOSIS — E78.00 PURE HYPERCHOLESTEROLEMIA, UNSPECIFIED: ICD-10-CM

## 2023-10-20 NOTE — TELEPHONE ENCOUNTER
She's calling about the MRI of the Lumbar Spine hasn't been approved.      Regarding case number - [id number]

## 2023-10-22 RX ORDER — LOSARTAN POTASSIUM 25 MG/1
TABLET ORAL
Qty: 90 TABLET | Refills: 1 | Status: SHIPPED | OUTPATIENT
Start: 2023-10-22

## 2023-10-23 NOTE — TELEPHONE ENCOUNTER
Returned call to [employer] and representative has not been able to find any information regarding imaging authorization under the reference number, ID # under pts plan, even searching him by name and  there is nothing regarding an auth for imaging.

## 2023-11-17 ENCOUNTER — OFFICE VISIT (OUTPATIENT)
Facility: CLINIC | Age: 53
End: 2023-11-17
Payer: COMMERCIAL

## 2023-11-17 ENCOUNTER — TELEPHONE (OUTPATIENT)
Age: 53
End: 2023-11-17

## 2023-11-17 VITALS
WEIGHT: 236.8 LBS | DIASTOLIC BLOOD PRESSURE: 80 MMHG | HEART RATE: 103 BPM | RESPIRATION RATE: 14 BRPM | TEMPERATURE: 99.5 F | OXYGEN SATURATION: 99 % | HEIGHT: 70 IN | BODY MASS INDEX: 33.9 KG/M2 | SYSTOLIC BLOOD PRESSURE: 127 MMHG

## 2023-11-17 DIAGNOSIS — Z86.73 HISTORY OF CVA (CEREBROVASCULAR ACCIDENT): ICD-10-CM

## 2023-11-17 DIAGNOSIS — R53.1 WEAKNESS: ICD-10-CM

## 2023-11-17 DIAGNOSIS — E78.00 PURE HYPERCHOLESTEROLEMIA, UNSPECIFIED: ICD-10-CM

## 2023-11-17 DIAGNOSIS — I10 ESSENTIAL (PRIMARY) HYPERTENSION: Primary | ICD-10-CM

## 2023-11-17 PROCEDURE — 3074F SYST BP LT 130 MM HG: CPT | Performed by: FAMILY MEDICINE

## 2023-11-17 PROCEDURE — 3078F DIAST BP <80 MM HG: CPT | Performed by: FAMILY MEDICINE

## 2023-11-17 PROCEDURE — 99214 OFFICE O/P EST MOD 30 MIN: CPT | Performed by: FAMILY MEDICINE

## 2023-11-17 NOTE — TELEPHONE ENCOUNTER
Patient is calling regarding his MRI he was suppose to have recently and did not. Stated the insurance company needs to be contacted to see what's needed so he can have the MRI.       Everette Navarro-  Irene Cork [phone]

## 2023-12-11 NOTE — TELEPHONE ENCOUNTER
Patient would like a call from [facility] or her nurse due to him never having the ,RI because it was coded wrong. Patient canceled his upcoming appointment this Wednesday.

## 2024-02-24 DIAGNOSIS — I10 ESSENTIAL (PRIMARY) HYPERTENSION: ICD-10-CM

## 2024-05-19 DIAGNOSIS — E78.00 PURE HYPERCHOLESTEROLEMIA, UNSPECIFIED: ICD-10-CM

## 2024-05-19 DIAGNOSIS — I10 ESSENTIAL (PRIMARY) HYPERTENSION: ICD-10-CM

## 2024-05-19 DIAGNOSIS — J30.89 OTHER ALLERGIC RHINITIS: ICD-10-CM

## 2024-05-19 DIAGNOSIS — Z86.73 PERSONAL HISTORY OF TRANSIENT ISCHEMIC ATTACK (TIA), AND CEREBRAL INFARCTION WITHOUT RESIDUAL DEFICITS: ICD-10-CM

## 2024-05-20 ENCOUNTER — TELEPHONE (OUTPATIENT)
Facility: CLINIC | Age: 54
End: 2024-05-20

## 2024-05-20 RX ORDER — ATORVASTATIN CALCIUM 40 MG/1
TABLET, FILM COATED ORAL
Qty: 90 TABLET | Refills: 1 | Status: SHIPPED | OUTPATIENT
Start: 2024-05-20

## 2024-05-20 RX ORDER — MONTELUKAST SODIUM 10 MG/1
TABLET ORAL
Qty: 90 TABLET | Refills: 1 | Status: SHIPPED | OUTPATIENT
Start: 2024-05-20

## 2024-05-20 RX ORDER — LEVOCETIRIZINE DIHYDROCHLORIDE 5 MG/1
TABLET, FILM COATED ORAL
Qty: 90 TABLET | Refills: 1 | Status: SHIPPED | OUTPATIENT
Start: 2024-05-20

## 2024-05-20 RX ORDER — CLOPIDOGREL BISULFATE 75 MG/1
TABLET ORAL
Qty: 90 TABLET | Refills: 1 | Status: SHIPPED | OUTPATIENT
Start: 2024-05-20

## 2024-05-20 RX ORDER — LOSARTAN POTASSIUM 25 MG/1
TABLET ORAL
Qty: 90 TABLET | Refills: 1 | Status: SHIPPED | OUTPATIENT
Start: 2024-05-20

## 2024-05-20 NOTE — TELEPHONE ENCOUNTER
Care Transitions Initial Follow Up Call    Outreach made within 2 business days of discharge: Yes    Patient: Kendall Rees Patient : 1970   MRN: [id number]  Reason for Admission: facial numbness, weakness, bells palsy  Discharge Date: 24      Spoke with: patient    Discharge department/facility: [facility] Interactive Patient Contact:  Was patient able to fill all prescriptions: No: picking up today  Was patient instructed to bring all medications to the follow-up visit: Yes  Is patient taking all medications as directed in the discharge summary? No  Does patient understand their discharge instructions: Yes  Does patient have questions or concerns that need addressed prior to 7-14 day follow up office visit: no    Scheduled appointment with PCP within 7-14 days: yes    Follow Up      ALEXA BLANK RN

## 2024-05-30 ENCOUNTER — OFFICE VISIT (OUTPATIENT)
Facility: CLINIC | Age: 54
End: 2024-05-30
Payer: COMMERCIAL

## 2024-05-30 VITALS
OXYGEN SATURATION: 96 % | HEIGHT: 70 IN | WEIGHT: 240.6 LBS | HEART RATE: 92 BPM | RESPIRATION RATE: 18 BRPM | TEMPERATURE: 97.8 F | SYSTOLIC BLOOD PRESSURE: 129 MMHG | BODY MASS INDEX: 34.44 KG/M2 | DIASTOLIC BLOOD PRESSURE: 81 MMHG

## 2024-05-30 DIAGNOSIS — G51.0 BELL'S PALSY: Primary | ICD-10-CM

## 2024-05-30 DIAGNOSIS — Z86.73 HISTORY OF STROKE: ICD-10-CM

## 2024-05-30 DIAGNOSIS — I10 ESSENTIAL (PRIMARY) HYPERTENSION: ICD-10-CM

## 2024-05-30 PROCEDURE — 3074F SYST BP LT 130 MM HG: CPT | Performed by: FAMILY MEDICINE

## 2024-05-30 PROCEDURE — 99214 OFFICE O/P EST MOD 30 MIN: CPT | Performed by: FAMILY MEDICINE

## 2024-05-30 PROCEDURE — 3079F DIAST BP 80-89 MM HG: CPT | Performed by: FAMILY MEDICINE

## 2024-05-30 RX ORDER — PREDNISONE 20 MG/1
60 TABLET ORAL DAILY
COMMUNITY
Start: 2024-05-20 | End: 2024-05-30 | Stop reason: SDUPTHER

## 2024-05-30 RX ORDER — VALACYCLOVIR HYDROCHLORIDE 1 G/1
1000 TABLET, FILM COATED ORAL 3 TIMES DAILY
COMMUNITY
Start: 2024-05-20

## 2024-05-30 RX ORDER — PREDNISONE 20 MG/1
20 TABLET ORAL DAILY
Qty: 7 TABLET | Refills: 0 | Status: SHIPPED | OUTPATIENT
Start: 2024-05-30 | End: 2024-06-06

## 2024-05-30 ASSESSMENT — PATIENT HEALTH QUESTIONNAIRE - PHQ9
SUM OF ALL RESPONSES TO PHQ9 QUESTIONS 1 & 2: 0
2. FEELING DOWN, DEPRESSED OR HOPELESS: NOT AT ALL
1. LITTLE INTEREST OR PLEASURE IN DOING THINGS: NOT AT ALL
SUM OF ALL RESPONSES TO PHQ QUESTIONS 1-9: 0

## 2024-06-01 NOTE — PROGRESS NOTES
Chief Complaint   Patient presents with    Follow-Up from Hospital     Dc'd from [facility]       \"Have you been to the ER, urgent care clinic since your last visit?  Hospitalized since your last visit?\"    YES-BENJI  “Have you seen or consulted any other health care providers outside of [facility] since your last visit?”    NO    “Have you had a colorectal cancer screening such as a colonoscopy/FIT/Cologuard?    NO    No colonoscopy on file  No cologuard on file  No FIT/FOBT on file   No flexible sigmoidoscopy on file               Health Maintenance Due   Topic Date Due    Hepatitis B vaccine (1 of 3 - 3-dose series) Never done    COVID-19 Vaccine (1) Never done    HIV screen  Never done    Hepatitis C screen  Never done    DTaP/Tdap/Td vaccine (1 - Tdap) Never done    Colorectal Cancer Screen  Never done    Shingles vaccine (2 of 2) 05/05/2023    Lipids  04/22/2024        
Appointments   Date Time Provider Department Center   7/2/2024 10:00 AM Gabriel Jhaveri MD [facility]         Follow-up and Dispositions    Return in about 1 month (around 7/1/2024).

## 2024-07-10 ENCOUNTER — OFFICE VISIT (OUTPATIENT)
Facility: CLINIC | Age: 54
End: 2024-07-10
Payer: COMMERCIAL

## 2024-07-10 VITALS
HEIGHT: 70 IN | TEMPERATURE: 98 F | DIASTOLIC BLOOD PRESSURE: 82 MMHG | RESPIRATION RATE: 16 BRPM | OXYGEN SATURATION: 97 % | SYSTOLIC BLOOD PRESSURE: 120 MMHG | BODY MASS INDEX: 34.13 KG/M2 | HEART RATE: 94 BPM | WEIGHT: 238.4 LBS

## 2024-07-10 DIAGNOSIS — G51.0 BELL'S PALSY: Primary | ICD-10-CM

## 2024-07-10 DIAGNOSIS — I10 ESSENTIAL (PRIMARY) HYPERTENSION: ICD-10-CM

## 2024-07-10 DIAGNOSIS — H92.03 OTALGIA OF BOTH EARS: ICD-10-CM

## 2024-07-10 DIAGNOSIS — Z86.73 HISTORY OF STROKE: ICD-10-CM

## 2024-07-10 DIAGNOSIS — E78.00 PURE HYPERCHOLESTEROLEMIA, UNSPECIFIED: ICD-10-CM

## 2024-07-10 DIAGNOSIS — J30.89 OTHER ALLERGIC RHINITIS: ICD-10-CM

## 2024-07-10 PROCEDURE — 3079F DIAST BP 80-89 MM HG: CPT | Performed by: FAMILY MEDICINE

## 2024-07-10 PROCEDURE — 3074F SYST BP LT 130 MM HG: CPT | Performed by: FAMILY MEDICINE

## 2024-07-10 PROCEDURE — 99214 OFFICE O/P EST MOD 30 MIN: CPT | Performed by: FAMILY MEDICINE

## 2024-07-10 RX ORDER — PREDNISONE 20 MG/1
TABLET ORAL
Qty: 18 TABLET | Refills: 0 | Status: SHIPPED | OUTPATIENT
Start: 2024-07-10

## 2024-07-10 NOTE — PROGRESS NOTES
Chief Complaint   Patient presents with    Follow-up        \"Have you been to the ER, urgent care clinic since your last visit?  Hospitalized since your last visit?\"    NO    “Have you seen or consulted any other health care providers outside of [facility] since your last visit?”    NO        “Have you had a colorectal cancer screening such as a colonoscopy/FIT/Cologuard?    NO 2010?    No colonoscopy on file  No cologuard on file  No FIT/FOBT on file   No flexible sigmoidoscopy on file         Click Here for Release of Records Request     Health Maintenance Due   Topic Date Due    Hepatitis B vaccine (1 of 3 - 3-dose series) Never done    COVID-19 Vaccine (1) Never done    HIV screen  Never done    Hepatitis C screen  Never done    DTaP/Tdap/Td vaccine (1 - Tdap) Never done    Colorectal Cancer Screen  Never done    Shingles vaccine (2 of 2) 05/05/2023    Lipids  04/22/2024       
Surgical History:   Procedure Laterality Date    CHOLECYSTECTOMY       Social History     Socioeconomic History    Marital status:      Spouse name: Not on file    Number of children: Not on file    Years of education: Not on file    Highest education level: Not on file   Occupational History    Not on file   Tobacco Use    Smoking status: Never    Smokeless tobacco: Never   Substance and Sexual Activity    Alcohol use: No    Drug use: Not Currently    Sexual activity: Not on file   Other Topics Concern    Not on file   Social History Narrative    Not on file     Social Determinants of Health     Financial Resource Strain: Low Risk  (8/14/2023)    Overall Financial Resource Strain (CARDIA)     Difficulty of Paying Living Expenses: Not hard at all   Food Insecurity: Not on file (8/14/2023)   Transportation Needs: Unknown (8/14/2023)    PRAPARE - Transportation     Lack of Transportation (Medical): Not on file     Lack of Transportation (Non-Medical): No   Physical Activity: Not on file   Stress: Not on file   Social Connections: Not on file   Intimate Partner Violence: Not on file   Housing Stability: Unknown (8/14/2023)    Housing Stability Vital Sign     Unable to Pay for Housing in the Last Year: Not on file     Number of Places Lived in the Last Year: Not on file     Unstable Housing in the Last Year: No     History reviewed. No pertinent family history.  Current Outpatient Medications   Medication Sig    predniSONE (DELTASONE) 20 MG tablet Take 3 tabs for three days, Then take 2 tabs for three days, Then take 1 tabs for three days.    montelukast (SINGULAIR) 10 MG tablet TAKE 1 TABLET BY MOUTH EVERY DAY    clopidogrel (PLAVIX) 75 MG tablet TAKE 1 TABLET BY MOUTH EVERY DAY    levocetirizine (XYZAL) 5 MG tablet TAKE 1 TABLET BY MOUTH EVERY DAY    losartan (COZAAR) 25 MG tablet TAKE 1 TABLET BY MOUTH EVERY DAY    atorvastatin (LIPITOR) 40 MG tablet TAKE 1 TABLET BY MOUTH EVERY DAY    metoprolol tartrate

## 2024-07-25 DIAGNOSIS — E78.00 PURE HYPERCHOLESTEROLEMIA, UNSPECIFIED: ICD-10-CM

## 2024-07-25 DIAGNOSIS — I10 ESSENTIAL (PRIMARY) HYPERTENSION: ICD-10-CM

## 2024-07-25 DIAGNOSIS — M54.50 LOW BACK PAIN, UNSPECIFIED: ICD-10-CM

## 2024-07-25 RX ORDER — METHOCARBAMOL 500 MG/1
TABLET, FILM COATED ORAL
Qty: 90 TABLET | Refills: 1 | Status: SHIPPED | OUTPATIENT
Start: 2024-07-25

## 2024-07-25 NOTE — TELEPHONE ENCOUNTER
Pt called to request a refill on the following medication:    -methocarbamol (ROBAXIN) 500 MG tablet     Please send to [facility].    Please advise..

## 2024-07-25 NOTE — ADDENDUM NOTE
Addended by: VICK REYES on: 7/25/2024 03:27 PM     Modules accepted: Orders     Improve sit-to-supine to CGA by d/c.

## 2024-07-26 LAB
ALBUMIN SERPL-MCNC: 4 G/DL (ref 3.5–5)
ALBUMIN/GLOB SERPL: 1.3 (ref 1.1–2.2)
ALP SERPL-CCNC: 86 U/L (ref 45–117)
ALT SERPL-CCNC: 66 U/L (ref 12–78)
ANION GAP SERPL CALC-SCNC: 6 MMOL/L (ref 5–15)
AST SERPL-CCNC: 32 U/L (ref 15–37)
BILIRUB SERPL-MCNC: 1 MG/DL (ref 0.2–1)
BUN SERPL-MCNC: 11 MG/DL (ref 6–20)
BUN/CREAT SERPL: 11 (ref 12–20)
CALCIUM SERPL-MCNC: 9.5 MG/DL (ref 8.5–10.1)
CHLORIDE SERPL-SCNC: 108 MMOL/L (ref 97–108)
CHOLEST SERPL-MCNC: 136 MG/DL
CO2 SERPL-SCNC: 27 MMOL/L (ref 21–32)
CREAT SERPL-MCNC: 0.97 MG/DL (ref 0.7–1.3)
ERYTHROCYTE [DISTWIDTH] IN BLOOD BY AUTOMATED COUNT: 13.3 % (ref 11.5–14.5)
GLOBULIN SER CALC-MCNC: 3 G/DL (ref 2–4)
GLUCOSE SERPL-MCNC: 106 MG/DL (ref 65–100)
HCT VFR BLD AUTO: 45.2 % (ref 36.6–50.3)
HDLC SERPL-MCNC: 36 MG/DL
HDLC SERPL: 3.8 (ref 0–5)
HGB BLD-MCNC: 15.2 G/DL (ref 12.1–17)
LDLC SERPL CALC-MCNC: 39.6 MG/DL (ref 0–100)
MCH RBC QN AUTO: 30.1 PG (ref 26–34)
MCHC RBC AUTO-ENTMCNC: 33.6 G/DL (ref 30–36.5)
MCV RBC AUTO: 89.5 FL (ref 80–99)
NRBC # BLD: 0 K/UL (ref 0–0.01)
NRBC BLD-RTO: 0 PER 100 WBC
PLATELET # BLD AUTO: 207 K/UL (ref 150–400)
PMV BLD AUTO: 10 FL (ref 8.9–12.9)
POTASSIUM SERPL-SCNC: 4.1 MMOL/L (ref 3.5–5.1)
PROT SERPL-MCNC: 7 G/DL (ref 6.4–8.2)
RBC # BLD AUTO: 5.05 M/UL (ref 4.1–5.7)
SODIUM SERPL-SCNC: 141 MMOL/L (ref 136–145)
TRIGL SERPL-MCNC: 302 MG/DL
TSH SERPL DL<=0.05 MIU/L-ACNC: 4.14 UIU/ML (ref 0.36–3.74)
VLDLC SERPL CALC-MCNC: 60.4 MG/DL
WBC # BLD AUTO: 9.5 K/UL (ref 4.1–11.1)

## 2024-08-19 DIAGNOSIS — I10 ESSENTIAL (PRIMARY) HYPERTENSION: ICD-10-CM

## 2024-10-02 ENCOUNTER — COMMUNITY OUTREACH (OUTPATIENT)
Facility: CLINIC | Age: 54
End: 2024-10-02

## 2024-10-15 ENCOUNTER — OFFICE VISIT (OUTPATIENT)
Facility: CLINIC | Age: 54
End: 2024-10-15
Payer: COMMERCIAL

## 2024-10-15 VITALS
HEIGHT: 70 IN | HEART RATE: 86 BPM | DIASTOLIC BLOOD PRESSURE: 88 MMHG | OXYGEN SATURATION: 96 % | RESPIRATION RATE: 18 BRPM | TEMPERATURE: 98 F | BODY MASS INDEX: 34.07 KG/M2 | WEIGHT: 238 LBS | SYSTOLIC BLOOD PRESSURE: 133 MMHG

## 2024-10-15 DIAGNOSIS — Z23 ENCOUNTER FOR VACCINATION: Primary | ICD-10-CM

## 2024-10-15 PROCEDURE — 90471 IMMUNIZATION ADMIN: CPT | Performed by: FAMILY MEDICINE

## 2024-10-15 PROCEDURE — 90661 CCIIV3 VAC ABX FR 0.5 ML IM: CPT | Performed by: FAMILY MEDICINE

## 2024-10-15 SDOH — ECONOMIC STABILITY: FOOD INSECURITY: WITHIN THE PAST 12 MONTHS, THE FOOD YOU BOUGHT JUST DIDN'T LAST AND YOU DIDN'T HAVE MONEY TO GET MORE.: NEVER TRUE

## 2024-10-15 SDOH — ECONOMIC STABILITY: INCOME INSECURITY: HOW HARD IS IT FOR YOU TO PAY FOR THE VERY BASICS LIKE FOOD, HOUSING, MEDICAL CARE, AND HEATING?: NOT HARD AT ALL

## 2024-10-15 SDOH — ECONOMIC STABILITY: FOOD INSECURITY: WITHIN THE PAST 12 MONTHS, YOU WORRIED THAT YOUR FOOD WOULD RUN OUT BEFORE YOU GOT MONEY TO BUY MORE.: NEVER TRUE

## 2024-10-15 NOTE — PROGRESS NOTES
\"Have you been to the ER, urgent care clinic since your last visit?  Hospitalized since your last visit?\"    NO    “Have you seen or consulted any other health care providers outside our system since your last visit?”    NO      “Have you had a colorectal cancer screening such as a colonoscopy/FIT/Cologuard?    NO    No colonoscopy on file  No cologuard on file  No FIT/FOBT on file   No flexible sigmoidoscopy on file

## 2024-11-06 ENCOUNTER — TELEPHONE (OUTPATIENT)
Facility: CLINIC | Age: 54
End: 2024-11-06

## 2024-11-06 DIAGNOSIS — I10 ESSENTIAL (PRIMARY) HYPERTENSION: ICD-10-CM

## 2024-11-06 DIAGNOSIS — I10 ESSENTIAL (PRIMARY) HYPERTENSION: Primary | ICD-10-CM

## 2024-11-06 NOTE — TELEPHONE ENCOUNTER
Lab ordered.    ----- Message from Martha ROMERO sent at 11/6/2024 10:07 AM EST -----  Regarding: Lab redraw  Pt is here saying that he was told to get his labs redrawn.. no orders    Thanks

## 2024-11-07 LAB — TSH SERPL DL<=0.05 MIU/L-ACNC: 2.15 UIU/ML (ref 0.36–3.74)

## 2024-11-10 DIAGNOSIS — E78.00 PURE HYPERCHOLESTEROLEMIA, UNSPECIFIED: ICD-10-CM

## 2024-11-10 DIAGNOSIS — J30.89 OTHER ALLERGIC RHINITIS: ICD-10-CM

## 2024-11-10 DIAGNOSIS — Z86.73 PERSONAL HISTORY OF TRANSIENT ISCHEMIC ATTACK (TIA), AND CEREBRAL INFARCTION WITHOUT RESIDUAL DEFICITS: ICD-10-CM

## 2024-11-10 DIAGNOSIS — I10 ESSENTIAL (PRIMARY) HYPERTENSION: ICD-10-CM

## 2024-11-11 ENCOUNTER — OFFICE VISIT (OUTPATIENT)
Facility: CLINIC | Age: 54
End: 2024-11-11
Payer: MEDICARE

## 2024-11-11 VITALS
SYSTOLIC BLOOD PRESSURE: 125 MMHG | DIASTOLIC BLOOD PRESSURE: 83 MMHG | RESPIRATION RATE: 16 BRPM | HEART RATE: 87 BPM | HEIGHT: 70 IN | WEIGHT: 240.2 LBS | OXYGEN SATURATION: 96 % | BODY MASS INDEX: 34.39 KG/M2 | TEMPERATURE: 97.6 F

## 2024-11-11 DIAGNOSIS — E78.00 PURE HYPERCHOLESTEROLEMIA, UNSPECIFIED: ICD-10-CM

## 2024-11-11 DIAGNOSIS — J30.89 OTHER ALLERGIC RHINITIS: ICD-10-CM

## 2024-11-11 DIAGNOSIS — Z86.73 PERSONAL HISTORY OF TRANSIENT ISCHEMIC ATTACK (TIA), AND CEREBRAL INFARCTION WITHOUT RESIDUAL DEFICITS: ICD-10-CM

## 2024-11-11 DIAGNOSIS — I10 ESSENTIAL (PRIMARY) HYPERTENSION: ICD-10-CM

## 2024-11-11 PROCEDURE — 3017F COLORECTAL CA SCREEN DOC REV: CPT | Performed by: FAMILY MEDICINE

## 2024-11-11 PROCEDURE — 1036F TOBACCO NON-USER: CPT | Performed by: FAMILY MEDICINE

## 2024-11-11 PROCEDURE — G8484 FLU IMMUNIZE NO ADMIN: HCPCS | Performed by: FAMILY MEDICINE

## 2024-11-11 PROCEDURE — G8417 CALC BMI ABV UP PARAM F/U: HCPCS | Performed by: FAMILY MEDICINE

## 2024-11-11 PROCEDURE — 3079F DIAST BP 80-89 MM HG: CPT | Performed by: FAMILY MEDICINE

## 2024-11-11 PROCEDURE — 99214 OFFICE O/P EST MOD 30 MIN: CPT | Performed by: FAMILY MEDICINE

## 2024-11-11 PROCEDURE — 3074F SYST BP LT 130 MM HG: CPT | Performed by: FAMILY MEDICINE

## 2024-11-11 PROCEDURE — G8427 DOCREV CUR MEDS BY ELIG CLIN: HCPCS | Performed by: FAMILY MEDICINE

## 2024-11-11 RX ORDER — MONTELUKAST SODIUM 10 MG/1
10 TABLET ORAL DAILY
Qty: 90 TABLET | Refills: 1 | Status: SHIPPED | OUTPATIENT
Start: 2024-11-11

## 2024-11-11 RX ORDER — CLOPIDOGREL BISULFATE 75 MG/1
75 TABLET ORAL DAILY
Qty: 90 TABLET | Refills: 1 | Status: SHIPPED | OUTPATIENT
Start: 2024-11-11

## 2024-11-11 RX ORDER — MONTELUKAST SODIUM 10 MG/1
TABLET ORAL
Qty: 90 TABLET | Refills: 1 | OUTPATIENT
Start: 2024-11-11

## 2024-11-11 RX ORDER — LOSARTAN POTASSIUM 25 MG/1
25 TABLET ORAL DAILY
Qty: 90 TABLET | Refills: 1 | Status: SHIPPED | OUTPATIENT
Start: 2024-11-11

## 2024-11-11 RX ORDER — ATORVASTATIN CALCIUM 40 MG/1
40 TABLET, FILM COATED ORAL DAILY
Qty: 90 TABLET | Refills: 1 | Status: SHIPPED | OUTPATIENT
Start: 2024-11-11

## 2024-11-11 RX ORDER — LOSARTAN POTASSIUM 25 MG/1
TABLET ORAL
Qty: 90 TABLET | Refills: 1 | OUTPATIENT
Start: 2024-11-11

## 2024-11-11 RX ORDER — LEVOCETIRIZINE DIHYDROCHLORIDE 5 MG/1
TABLET, FILM COATED ORAL
Qty: 90 TABLET | Refills: 1 | Status: SHIPPED | OUTPATIENT
Start: 2024-11-11

## 2024-11-11 RX ORDER — ATORVASTATIN CALCIUM 40 MG/1
TABLET, FILM COATED ORAL
Qty: 90 TABLET | Refills: 1 | OUTPATIENT
Start: 2024-11-11

## 2024-11-11 RX ORDER — CLOPIDOGREL BISULFATE 75 MG/1
TABLET ORAL
Qty: 90 TABLET | Refills: 1 | OUTPATIENT
Start: 2024-11-11

## 2024-11-11 ASSESSMENT — PATIENT HEALTH QUESTIONNAIRE - PHQ9
SUM OF ALL RESPONSES TO PHQ QUESTIONS 1-9: 0
SUM OF ALL RESPONSES TO PHQ9 QUESTIONS 1 & 2: 0
1. LITTLE INTEREST OR PLEASURE IN DOING THINGS: NOT AT ALL
2. FEELING DOWN, DEPRESSED OR HOPELESS: NOT AT ALL
SUM OF ALL RESPONSES TO PHQ QUESTIONS 1-9: 0

## 2024-11-11 ASSESSMENT — LIFESTYLE VARIABLES
HOW MANY STANDARD DRINKS CONTAINING ALCOHOL DO YOU HAVE ON A TYPICAL DAY: PATIENT DOES NOT DRINK
HOW OFTEN DO YOU HAVE A DRINK CONTAINING ALCOHOL: NEVER

## 2024-11-11 NOTE — PROGRESS NOTES
Chief Complaint   Patient presents with    Follow-up Chronic Condition      \"Have you been to the ER, urgent care clinic since your last visit?  Hospitalized since your last visit?\"    NO    “Have you seen or consulted any other health care providers outside of [facility] since your last visit?”    YES, [facility]        “Have you had a colorectal cancer screening such as a colonoscopy/FIT/Cologuard?    NO    No colonoscopy on file  No cologuard on file  No FIT/FOBT on file   No flexible sigmoidoscopy on file         Click Here for Release of Records Request     Health Maintenance Due   Topic Date Due    HIV screen  Never done    Hepatitis C screen  Never done    Hepatitis B vaccine (1 of 3 - 19+ 3-dose series) Never done    DTaP/Tdap/Td vaccine (1 - Tdap) Never done    Colorectal Cancer Screen  Never done    Shingles vaccine (2 of 2) 05/05/2023    Annual Wellness Visit (Medicare Advantage)  Never done    COVID-19 Vaccine (1 - 2023-24 season) Never done

## 2024-11-11 NOTE — PATIENT INSTRUCTIONS
[facility]  [address], [address]   [place] [address] [phone]

## 2024-11-16 DIAGNOSIS — J30.89 OTHER ALLERGIC RHINITIS: ICD-10-CM

## 2024-11-18 RX ORDER — MONTELUKAST SODIUM 10 MG/1
10 TABLET ORAL DAILY
Qty: 90 TABLET | Refills: 1 | Status: SHIPPED | OUTPATIENT
Start: 2024-11-18

## 2024-12-15 DIAGNOSIS — M54.50 LOW BACK PAIN, UNSPECIFIED: ICD-10-CM

## 2024-12-16 RX ORDER — METHOCARBAMOL 500 MG/1
TABLET, FILM COATED ORAL
Qty: 90 TABLET | Refills: 1 | Status: SHIPPED | OUTPATIENT
Start: 2024-12-16

## 2025-02-07 DIAGNOSIS — I10 ESSENTIAL (PRIMARY) HYPERTENSION: ICD-10-CM

## 2025-02-08 RX ORDER — METOPROLOL TARTRATE 25 MG/1
12.5 TABLET, FILM COATED ORAL 2 TIMES DAILY
Qty: 90 TABLET | Refills: 1 | Status: SHIPPED | OUTPATIENT
Start: 2025-02-08

## 2025-03-11 ENCOUNTER — OFFICE VISIT (OUTPATIENT)
Facility: CLINIC | Age: 55
End: 2025-03-11
Payer: MEDICARE

## 2025-03-11 VITALS
HEART RATE: 86 BPM | BODY MASS INDEX: 35.01 KG/M2 | OXYGEN SATURATION: 96 % | TEMPERATURE: 98.9 F | SYSTOLIC BLOOD PRESSURE: 126 MMHG | RESPIRATION RATE: 16 BRPM | DIASTOLIC BLOOD PRESSURE: 86 MMHG | WEIGHT: 244 LBS

## 2025-03-11 DIAGNOSIS — E78.00 PURE HYPERCHOLESTEROLEMIA, UNSPECIFIED: ICD-10-CM

## 2025-03-11 DIAGNOSIS — Z86.73 HISTORY OF CVA (CEREBROVASCULAR ACCIDENT): ICD-10-CM

## 2025-03-11 DIAGNOSIS — G51.0 BELL'S PALSY: ICD-10-CM

## 2025-03-11 DIAGNOSIS — I10 ESSENTIAL (PRIMARY) HYPERTENSION: Primary | ICD-10-CM

## 2025-03-11 PROCEDURE — 99214 OFFICE O/P EST MOD 30 MIN: CPT | Performed by: FAMILY MEDICINE

## 2025-03-11 PROCEDURE — 3079F DIAST BP 80-89 MM HG: CPT | Performed by: FAMILY MEDICINE

## 2025-03-11 PROCEDURE — 3074F SYST BP LT 130 MM HG: CPT | Performed by: FAMILY MEDICINE

## 2025-03-11 RX ORDER — PREDNISONE 10 MG/1
10 TABLET ORAL DAILY
Qty: 10 TABLET | Refills: 1 | Status: SHIPPED | OUTPATIENT
Start: 2025-03-11

## 2025-03-11 SDOH — ECONOMIC STABILITY: FOOD INSECURITY: WITHIN THE PAST 12 MONTHS, YOU WORRIED THAT YOUR FOOD WOULD RUN OUT BEFORE YOU GOT MONEY TO BUY MORE.: NEVER TRUE

## 2025-03-11 SDOH — ECONOMIC STABILITY: FOOD INSECURITY: WITHIN THE PAST 12 MONTHS, THE FOOD YOU BOUGHT JUST DIDN'T LAST AND YOU DIDN'T HAVE MONEY TO GET MORE.: NEVER TRUE

## 2025-03-11 ASSESSMENT — PATIENT HEALTH QUESTIONNAIRE - PHQ9
SUM OF ALL RESPONSES TO PHQ QUESTIONS 1-9: 0
2. FEELING DOWN, DEPRESSED OR HOPELESS: NOT AT ALL
SUM OF ALL RESPONSES TO PHQ QUESTIONS 1-9: 0
SUM OF ALL RESPONSES TO PHQ QUESTIONS 1-9: 0
1. LITTLE INTEREST OR PLEASURE IN DOING THINGS: NOT AT ALL
SUM OF ALL RESPONSES TO PHQ QUESTIONS 1-9: 0

## 2025-03-11 NOTE — PROGRESS NOTES
Chief Complaint   Patient presents with    Follow-up Chronic Condition        \"Have you been to the ER, urgent care clinic since your last visit?  Hospitalized since your last visit?\"    NO    “Have you seen or consulted any other health care providers outside of [facility] since your last visit?”    NO        “Have you had a colorectal cancer screening such as a colonoscopy/FIT/Cologuard?    NO    No colonoscopy on file  No cologuard on file  No FIT/FOBT on file   No flexible sigmoidoscopy on file         Click Here for Release of Records Request     Health Maintenance Due   Topic Date Due    HIV screen  Never done    Hepatitis C screen  Never done    Hepatitis B vaccine (1 of 3 - 19+ 3-dose series) Never done    DTaP/Tdap/Td vaccine (1 - Tdap) Never done    Colorectal Cancer Screen  Never done    Pneumococcal 50+ years Vaccine (1 of 1 - PCV) Never done    Shingles vaccine (2 of 2) 05/05/2023    COVID-19 Vaccine (1 - 2024-25 season) Never done    Annual Wellness Visit (Medicare Advantage)  Never done    Diabetes screen  03/09/2025

## 2025-03-13 ENCOUNTER — RESULTS FOLLOW-UP (OUTPATIENT)
Facility: CLINIC | Age: 55
End: 2025-03-13

## 2025-03-13 LAB
ALBUMIN SERPL-MCNC: 4.5 G/DL (ref 3.8–4.9)
ALP SERPL-CCNC: 87 IU/L (ref 44–121)
ALT SERPL-CCNC: 47 IU/L (ref 0–44)
AST SERPL-CCNC: 38 IU/L (ref 0–40)
BILIRUB SERPL-MCNC: 0.8 MG/DL (ref 0–1.2)
BUN SERPL-MCNC: 14 MG/DL (ref 6–24)
BUN/CREAT SERPL: 14 (ref 9–20)
CALCIUM SERPL-MCNC: 9.9 MG/DL (ref 8.7–10.2)
CHLORIDE SERPL-SCNC: 108 MMOL/L (ref 96–106)
CHOLEST SERPL-MCNC: 122 MG/DL (ref 100–199)
CO2 SERPL-SCNC: 24 MMOL/L (ref 20–29)
CREAT SERPL-MCNC: 0.98 MG/DL (ref 0.76–1.27)
EGFRCR SERPLBLD CKD-EPI 2021: 91 ML/MIN/1.73
ERYTHROCYTE [DISTWIDTH] IN BLOOD BY AUTOMATED COUNT: 13.4 % (ref 11.6–15.4)
GLOBULIN SER CALC-MCNC: 2.4 G/DL (ref 1.5–4.5)
GLUCOSE SERPL-MCNC: 94 MG/DL (ref 70–99)
HCT VFR BLD AUTO: 46.4 % (ref 37.5–51)
HDLC SERPL-MCNC: 33 MG/DL
HGB BLD-MCNC: 15.5 G/DL (ref 13–17.7)
LDLC SERPL CALC-MCNC: 50 MG/DL (ref 0–99)
MCH RBC QN AUTO: 29.5 PG (ref 26.6–33)
MCHC RBC AUTO-ENTMCNC: 33.4 G/DL (ref 31.5–35.7)
MCV RBC AUTO: 88 FL (ref 79–97)
PLATELET # BLD AUTO: 225 X10E3/UL (ref 150–450)
POTASSIUM SERPL-SCNC: 4.6 MMOL/L (ref 3.5–5.2)
PROT SERPL-MCNC: 6.9 G/DL (ref 6–8.5)
RBC # BLD AUTO: 5.26 X10E6/UL (ref 4.14–5.8)
SODIUM SERPL-SCNC: 143 MMOL/L (ref 134–144)
TRIGL SERPL-MCNC: 250 MG/DL (ref 0–149)
TSH SERPL DL<=0.005 MIU/L-ACNC: 2.37 UIU/ML (ref 0.45–4.5)
VIT B12 SERPL-MCNC: 477 PG/ML (ref 232–1245)
VLDLC SERPL CALC-MCNC: 39 MG/DL (ref 5–40)
WBC # BLD AUTO: 8.9 X10E3/UL (ref 3.4–10.8)

## 2025-05-02 ENCOUNTER — TELEPHONE (OUTPATIENT)
Facility: CLINIC | Age: 55
End: 2025-05-02

## 2025-05-02 DIAGNOSIS — J30.89 OTHER ALLERGIC RHINITIS: ICD-10-CM

## 2025-05-02 DIAGNOSIS — I10 ESSENTIAL (PRIMARY) HYPERTENSION: ICD-10-CM

## 2025-05-02 DIAGNOSIS — E78.00 PURE HYPERCHOLESTEROLEMIA, UNSPECIFIED: ICD-10-CM

## 2025-05-02 DIAGNOSIS — Z86.73 PERSONAL HISTORY OF TRANSIENT ISCHEMIC ATTACK (TIA), AND CEREBRAL INFARCTION WITHOUT RESIDUAL DEFICITS: ICD-10-CM

## 2025-05-02 DIAGNOSIS — G51.0 BELL'S PALSY: Primary | ICD-10-CM

## 2025-05-02 RX ORDER — LOSARTAN POTASSIUM 25 MG/1
25 TABLET ORAL DAILY
Qty: 90 TABLET | Refills: 1 | Status: SHIPPED | OUTPATIENT
Start: 2025-05-02

## 2025-05-02 RX ORDER — ATORVASTATIN CALCIUM 40 MG/1
40 TABLET, FILM COATED ORAL DAILY
Qty: 90 TABLET | Refills: 1 | Status: SHIPPED | OUTPATIENT
Start: 2025-05-02

## 2025-05-02 RX ORDER — PREDNISONE 20 MG/1
40 TABLET ORAL DAILY
Qty: 10 TABLET | Refills: 0 | Status: SHIPPED | OUTPATIENT
Start: 2025-05-02 | End: 2025-05-07

## 2025-05-02 RX ORDER — LEVOCETIRIZINE DIHYDROCHLORIDE 5 MG/1
5 TABLET, FILM COATED ORAL DAILY
Qty: 90 TABLET | Refills: 1 | Status: SHIPPED | OUTPATIENT
Start: 2025-05-02

## 2025-05-02 RX ORDER — CLOPIDOGREL BISULFATE 75 MG/1
75 TABLET ORAL DAILY
Qty: 90 TABLET | Refills: 1 | Status: SHIPPED | OUTPATIENT
Start: 2025-05-02

## 2025-05-02 RX ORDER — MONTELUKAST SODIUM 10 MG/1
10 TABLET ORAL DAILY
Qty: 90 TABLET | Refills: 1 | Status: SHIPPED | OUTPATIENT
Start: 2025-05-02

## 2025-05-02 NOTE — TELEPHONE ENCOUNTER
Appt has been made for 5/7. Pt wife states the tylenol is doing nothing for the pt and would like to know what he can take till his appt. Or if pcp can supply a short term med till OV. Pt wife will like a call back. Please advise.

## 2025-05-02 NOTE — TELEPHONE ENCOUNTER
Pt wife states pt was released from the hospital 2 weeks ago. States he has Bell's Palsy on both sides of his face. Pt wife states his neurologist prefer his pcp to prescribe him something stronger than Tylenol. Pt wife states pt can not sleep due to the pain and spasms. Please advise.

## 2025-05-07 ENCOUNTER — OFFICE VISIT (OUTPATIENT)
Facility: CLINIC | Age: 55
End: 2025-05-07
Payer: MEDICARE

## 2025-05-07 VITALS
HEIGHT: 70 IN | BODY MASS INDEX: 35.1 KG/M2 | TEMPERATURE: 98 F | SYSTOLIC BLOOD PRESSURE: 117 MMHG | DIASTOLIC BLOOD PRESSURE: 77 MMHG | RESPIRATION RATE: 18 BRPM | HEART RATE: 99 BPM | OXYGEN SATURATION: 96 % | WEIGHT: 245.2 LBS

## 2025-05-07 DIAGNOSIS — G51.0 BELL'S PALSY: Primary | ICD-10-CM

## 2025-05-07 DIAGNOSIS — E78.00 PURE HYPERCHOLESTEROLEMIA, UNSPECIFIED: ICD-10-CM

## 2025-05-07 DIAGNOSIS — Z86.73 HISTORY OF STROKE: ICD-10-CM

## 2025-05-07 DIAGNOSIS — R53.1 RIGHT SIDED WEAKNESS: ICD-10-CM

## 2025-05-07 DIAGNOSIS — I10 ESSENTIAL (PRIMARY) HYPERTENSION: ICD-10-CM

## 2025-05-07 PROCEDURE — 99214 OFFICE O/P EST MOD 30 MIN: CPT | Performed by: FAMILY MEDICINE

## 2025-05-07 PROCEDURE — 3078F DIAST BP <80 MM HG: CPT | Performed by: FAMILY MEDICINE

## 2025-05-07 PROCEDURE — 3074F SYST BP LT 130 MM HG: CPT | Performed by: FAMILY MEDICINE

## 2025-05-07 RX ORDER — VALACYCLOVIR HYDROCHLORIDE 1 G/1
1000 TABLET, FILM COATED ORAL 3 TIMES DAILY
COMMUNITY
Start: 2025-04-14

## 2025-05-07 RX ORDER — PREDNISONE 20 MG/1
20 TABLET ORAL DAILY
Qty: 30 TABLET | Refills: 0 | Status: SHIPPED | OUTPATIENT
Start: 2025-05-07 | End: 2025-06-06

## 2025-05-07 RX ORDER — PREGABALIN 50 MG/1
50 CAPSULE ORAL EVERY EVENING
Qty: 30 CAPSULE | Refills: 0 | Status: SHIPPED | OUTPATIENT
Start: 2025-05-07 | End: 2025-06-06

## 2025-05-07 NOTE — PROGRESS NOTES
Have you been to the ER, urgent care clinic since your last visit?  Hospitalized since your last visit?   YES/ [facility]    Have you seen or consulted any other health care providers outside our system since your last visit?   NO    “Have you had a colorectal cancer screening such as a colonoscopy/FIT/Cologuard?    NO

## 2025-05-14 NOTE — PROGRESS NOTES
Progress Note    Patient: Kendall Rees MRN: [id number]  SSN: [id number]    YOB: 1970  Age: 54 y.o.  Sex: male        Chief Complaint   Patient presents with    Discuss Medications     Discuss meds for bell's palsy     he is a 54 y.o. year old male who presents for follow up of follow up of chronic health conditions. Patient has now developed symptoms of [place] palsy on right side. He has been treated with prednisone and valacyclovir. He says he continues to have symptoms associated with [place] palsy on the left. Patient with hx of pontine CVA with right sided weakness. He is no longer followed by neurology. BP well controlled.       Encounter Diagnoses   Name Primary?    Bell's palsy Yes    Essential (primary) hypertension     Pure hypercholesterolemia, unspecified     Right sided weakness     History of stroke      BP Readings from Last 3 Encounters:   05/07/25 117/77   03/11/25 126/86   11/11/24 125/83     Wt Readings from Last 3 Encounters:   05/07/25 111.2 kg (245 lb 3.2 oz)   03/11/25 110.7 kg (244 lb)   11/11/24 109 kg (240 lb 3.2 oz)     Body mass index is 35.18 kg/m².    CMP:    Lab Results   Component Value Date/Time     03/11/2025 11:25 AM    K 4.6 03/11/2025 11:25 AM     03/11/2025 11:25 AM    CO2 24 03/11/2025 11:25 AM    BUN 14 03/11/2025 11:25 AM    CREATININE 0.98 03/11/2025 11:25 AM    AGRATIO 1.8 04/22/2023 12:00 AM    LABGLOM 91 03/11/2025 11:25 AM    LABGLOM 89 04/22/2023 12:00 AM    GLUCOSE 94 03/11/2025 11:25 AM    CALCIUM 9.9 03/11/2025 11:25 AM    BILITOT 0.8 03/11/2025 11:25 AM    ALKPHOS 87 03/11/2025 11:25 AM    AST 38 03/11/2025 11:25 AM    ALT 47 03/11/2025 11:25 AM     Lab Results   Component Value Date    CHOL 122 03/11/2025    TRIG 250 (H) 03/11/2025    HDL 33 (L) 03/11/2025    LDL 50 03/11/2025    VLDL 39 03/11/2025    CHOLHDLRATIO 3.8 07/25/2024           Patient Active Problem List   Diagnosis    Right sided weakness    Pure hypercholesterolemia    Class 2

## 2025-06-02 DIAGNOSIS — G51.0 BELL'S PALSY: ICD-10-CM

## 2025-06-02 RX ORDER — PREDNISONE 20 MG/1
20 TABLET ORAL DAILY
Qty: 30 TABLET | Refills: 0 | Status: SHIPPED | OUTPATIENT
Start: 2025-06-02

## 2025-06-02 RX ORDER — PREGABALIN 50 MG/1
CAPSULE ORAL
Qty: 30 CAPSULE | Refills: 0 | Status: SHIPPED | OUTPATIENT
Start: 2025-06-02 | End: 2025-07-02

## 2025-07-01 DIAGNOSIS — G51.0 BELL'S PALSY: ICD-10-CM

## 2025-07-01 RX ORDER — PREGABALIN 50 MG/1
CAPSULE ORAL
Qty: 30 CAPSULE | Refills: 0 | Status: SHIPPED | OUTPATIENT
Start: 2025-07-01 | End: 2025-07-31

## 2025-07-01 RX ORDER — PREDNISONE 20 MG/1
20 TABLET ORAL DAILY
Qty: 30 TABLET | Refills: 0 | Status: SHIPPED | OUTPATIENT
Start: 2025-07-01

## 2025-07-08 ENCOUNTER — OFFICE VISIT (OUTPATIENT)
Facility: CLINIC | Age: 55
End: 2025-07-08
Payer: MEDICARE

## 2025-07-08 VITALS
DIASTOLIC BLOOD PRESSURE: 84 MMHG | SYSTOLIC BLOOD PRESSURE: 120 MMHG | WEIGHT: 242 LBS | RESPIRATION RATE: 18 BRPM | BODY MASS INDEX: 34.65 KG/M2 | OXYGEN SATURATION: 96 % | HEIGHT: 70 IN | HEART RATE: 86 BPM | TEMPERATURE: 98.4 F

## 2025-07-08 DIAGNOSIS — I10 ESSENTIAL (PRIMARY) HYPERTENSION: ICD-10-CM

## 2025-07-08 DIAGNOSIS — Z86.73 HISTORY OF CVA (CEREBROVASCULAR ACCIDENT): ICD-10-CM

## 2025-07-08 DIAGNOSIS — S51.812A LACERATION OF SKIN OF LEFT FOREARM, INITIAL ENCOUNTER: ICD-10-CM

## 2025-07-08 DIAGNOSIS — G51.0 BELL'S PALSY: Primary | ICD-10-CM

## 2025-07-08 PROCEDURE — 3074F SYST BP LT 130 MM HG: CPT | Performed by: FAMILY MEDICINE

## 2025-07-08 PROCEDURE — 90715 TDAP VACCINE 7 YRS/> IM: CPT | Performed by: FAMILY MEDICINE

## 2025-07-08 PROCEDURE — 90471 IMMUNIZATION ADMIN: CPT | Performed by: FAMILY MEDICINE

## 2025-07-08 PROCEDURE — 99214 OFFICE O/P EST MOD 30 MIN: CPT | Performed by: FAMILY MEDICINE

## 2025-07-08 PROCEDURE — 3079F DIAST BP 80-89 MM HG: CPT | Performed by: FAMILY MEDICINE

## 2025-07-08 RX ORDER — PREDNISONE 10 MG/1
10 TABLET ORAL DAILY
Qty: 30 TABLET | Refills: 1 | Status: SHIPPED | OUTPATIENT
Start: 2025-07-08

## 2025-07-08 NOTE — PROGRESS NOTES
Chief Complaint   Patient presents with    Follow-up         \"Have you been to the ER, urgent care clinic since your last visit?  Hospitalized since your last visit?\"    NO    “Have you seen or consulted any other health care providers outside of [facility] since your last visit?”    NO      “Have you had a colorectal cancer screening such as a colonoscopy/FIT/Cologuard?    NO    No colonoscopy on file  No cologuard on file  No FIT/FOBT on file   No flexible sigmoidoscopy on file         Click Here for Release of Records Request     Health Maintenance Due   Topic Date Due    HIV screen  Never done    Hepatitis C screen  Never done    Hepatitis B vaccine (1 of 3 - 19+ 3-dose series) Never done    DTaP/Tdap/Td vaccine (1 - Tdap) Never done    Colorectal Cancer Screen  Never done    Pneumococcal 50+ years Vaccine (1 of 1 - PCV) Never done    Shingles vaccine (2 of 2) 05/05/2023    COVID-19 Vaccine (1 - 2024-25 season) Never done    Annual Wellness Visit (Medicare Advantage)  Never done

## 2025-07-15 NOTE — PROGRESS NOTES
Progress Note    Patient: Kendall Rees MRN: [id number]  SSN: [id number]    YOB: 1970  Age: 54 y.o.  Sex: male        Chief Complaint   Patient presents with    Follow-up     he is a 54 y.o. year old male who presents for follow up of follow up of chronic health conditions. Patient with b/l [place] palsy. He has been treated with prednisone and valacyclovir. Patient with hx of pontine CVA with right sided weakness. He is no longer followed by neurology. BP well controlled. Weight stable.      Encounter Diagnoses   Name Primary?    Bell's palsy Yes    Essential (primary) hypertension     History of CVA (cerebrovascular accident)     Laceration of skin of left forearm, initial encounter      BP Readings from Last 3 Encounters:   07/08/25 120/84   05/07/25 117/77   03/11/25 126/86     Wt Readings from Last 3 Encounters:   07/08/25 109.8 kg (242 lb)   05/07/25 111.2 kg (245 lb 3.2 oz)   03/11/25 110.7 kg (244 lb)     Body mass index is 34.72 kg/m².    CMP:    Lab Results   Component Value Date/Time     03/11/2025 11:25 AM    K 4.6 03/11/2025 11:25 AM     03/11/2025 11:25 AM    CO2 24 03/11/2025 11:25 AM    BUN 14 03/11/2025 11:25 AM    CREATININE 0.98 03/11/2025 11:25 AM    AGRATIO 1.8 04/22/2023 12:00 AM    LABGLOM 91 03/11/2025 11:25 AM    LABGLOM 89 04/22/2023 12:00 AM    GLUCOSE 94 03/11/2025 11:25 AM    CALCIUM 9.9 03/11/2025 11:25 AM    BILITOT 0.8 03/11/2025 11:25 AM    ALKPHOS 87 03/11/2025 11:25 AM    AST 38 03/11/2025 11:25 AM    ALT 47 03/11/2025 11:25 AM     Lab Results   Component Value Date    CHOL 122 03/11/2025    TRIG 250 (H) 03/11/2025    HDL 33 (L) 03/11/2025    LDL 50 03/11/2025    VLDL 39 03/11/2025    CHOLHDLRATIO 3.8 07/25/2024           Patient Active Problem List   Diagnosis    Right sided weakness    Pure hypercholesterolemia    Class 2 obesity due to excess calories without serious comorbidity with body mass index (BMI) of 35.0 to 35.9 in adult    History of CVA

## 2025-07-26 DIAGNOSIS — I10 ESSENTIAL (PRIMARY) HYPERTENSION: ICD-10-CM

## 2025-07-28 PROBLEM — I63.81 LACUNAR INFARCTION (HCC): Status: RESOLVED | Noted: 2022-02-23 | Resolved: 2025-07-28

## 2025-07-28 RX ORDER — METOPROLOL TARTRATE 25 MG/1
12.5 TABLET, FILM COATED ORAL 2 TIMES DAILY
Qty: 90 TABLET | Refills: 1 | Status: SHIPPED | OUTPATIENT
Start: 2025-07-28